# Patient Record
Sex: FEMALE | Race: WHITE | NOT HISPANIC OR LATINO | URBAN - METROPOLITAN AREA
[De-identification: names, ages, dates, MRNs, and addresses within clinical notes are randomized per-mention and may not be internally consistent; named-entity substitution may affect disease eponyms.]

---

## 2017-10-05 ENCOUNTER — INPATIENT (INPATIENT)
Facility: HOSPITAL | Age: 32
LOS: 1 days | Discharge: HOME | End: 2017-10-07
Attending: OBSTETRICS & GYNECOLOGY | Admitting: OBSTETRICS & GYNECOLOGY

## 2017-10-05 VITALS
RESPIRATION RATE: 18 BRPM | SYSTOLIC BLOOD PRESSURE: 121 MMHG | DIASTOLIC BLOOD PRESSURE: 69 MMHG | HEART RATE: 117 BPM | TEMPERATURE: 36 F

## 2017-10-05 LAB
ANISOCYTOSIS BLD QL: SLIGHT — SIGNIFICANT CHANGE UP
ANISOCYTOSIS BLD QL: SLIGHT — SIGNIFICANT CHANGE UP
BASO STIPL BLD QL SMEAR: SLIGHT — SIGNIFICANT CHANGE UP
BASOPHILS # BLD AUTO: 0.1 K/UL — SIGNIFICANT CHANGE UP (ref 0–0.2)
BASOPHILS # BLD AUTO: 0.1 K/UL — SIGNIFICANT CHANGE UP (ref 0–0.2)
BASOPHILS NFR BLD AUTO: 0.6 % — SIGNIFICANT CHANGE UP (ref 0–2)
BASOPHILS NFR BLD AUTO: 0.6 % — SIGNIFICANT CHANGE UP (ref 0–2)
BLD GP AB SCN SERPL QL: SIGNIFICANT CHANGE UP
DACRYOCYTES BLD QL SMEAR: SLIGHT — SIGNIFICANT CHANGE UP
DACRYOCYTES BLD QL SMEAR: SLIGHT — SIGNIFICANT CHANGE UP
ELLIPTOCYTES BLD QL SMEAR: SLIGHT — SIGNIFICANT CHANGE UP
EOSINOPHIL # BLD AUTO: 0.1 K/UL — SIGNIFICANT CHANGE UP (ref 0–0.5)
EOSINOPHIL # BLD AUTO: 0.2 K/UL — SIGNIFICANT CHANGE UP (ref 0–0.5)
EOSINOPHIL NFR BLD AUTO: 0.6 % — SIGNIFICANT CHANGE UP (ref 0–6)
EOSINOPHIL NFR BLD AUTO: 1.3 % — SIGNIFICANT CHANGE UP (ref 0–6)
HCT VFR BLD CALC: 30.4 % — LOW (ref 34.5–45)
HCT VFR BLD CALC: 31.5 % — LOW (ref 34.5–45)
HGB BLD-MCNC: 10 G/DL — LOW (ref 11.5–15.5)
HGB BLD-MCNC: 10.5 G/DL — LOW (ref 11.5–15.5)
HYPOCHROMIA BLD QL: SLIGHT — SIGNIFICANT CHANGE UP
LYMPHOCYTES # BLD AUTO: 1.9 K/UL — SIGNIFICANT CHANGE UP (ref 1–3.3)
LYMPHOCYTES # BLD AUTO: 17.9 % — SIGNIFICANT CHANGE UP (ref 13–44)
LYMPHOCYTES # BLD AUTO: 3.4 K/UL — HIGH (ref 1–3.3)
LYMPHOCYTES # BLD AUTO: 9.9 % — LOW (ref 13–44)
MANUAL DIF COMMENT BLD-IMP: SIGNIFICANT CHANGE UP
MANUAL DIF COMMENT BLD-IMP: SIGNIFICANT CHANGE UP
MCHC RBC-ENTMCNC: 25.8 PG — LOW (ref 27–34)
MCHC RBC-ENTMCNC: 25.8 PG — LOW (ref 27–34)
MCHC RBC-ENTMCNC: 33 GM/DL — SIGNIFICANT CHANGE UP (ref 32–36)
MCHC RBC-ENTMCNC: 33.2 GM/DL — SIGNIFICANT CHANGE UP (ref 32–36)
MCV RBC AUTO: 77.8 FL — LOW (ref 80–100)
MCV RBC AUTO: 78.3 FL — LOW (ref 80–100)
MICROCYTES BLD QL: SLIGHT — SIGNIFICANT CHANGE UP
MICROCYTES BLD QL: SLIGHT — SIGNIFICANT CHANGE UP
MONOCYTES # BLD AUTO: 1 K/UL — HIGH (ref 0–0.9)
MONOCYTES # BLD AUTO: 1.5 K/UL — HIGH (ref 0–0.9)
MONOCYTES NFR BLD AUTO: 5.3 % — SIGNIFICANT CHANGE UP (ref 2–14)
MONOCYTES NFR BLD AUTO: 7.9 % — SIGNIFICANT CHANGE UP (ref 2–14)
NEUTROPHILS # BLD AUTO: 13.9 K/UL — HIGH (ref 1.8–7.4)
NEUTROPHILS # BLD AUTO: 16.2 K/UL — HIGH (ref 1.8–7.4)
NEUTROPHILS NFR BLD AUTO: 72.3 % — SIGNIFICANT CHANGE UP (ref 43–77)
NEUTROPHILS NFR BLD AUTO: 83.5 % — HIGH (ref 43–77)
OVALOCYTES BLD QL SMEAR: SLIGHT — SIGNIFICANT CHANGE UP
PLAT MORPH BLD: NORMAL — SIGNIFICANT CHANGE UP
PLAT MORPH BLD: NORMAL — SIGNIFICANT CHANGE UP
PLATELET # BLD AUTO: 267 K/UL — SIGNIFICANT CHANGE UP (ref 150–400)
PLATELET # BLD AUTO: 274 K/UL — SIGNIFICANT CHANGE UP (ref 150–400)
POIKILOCYTOSIS BLD QL AUTO: SLIGHT — SIGNIFICANT CHANGE UP
POIKILOCYTOSIS BLD QL AUTO: SLIGHT — SIGNIFICANT CHANGE UP
POLYCHROMASIA BLD QL SMEAR: SLIGHT — SIGNIFICANT CHANGE UP
POLYCHROMASIA BLD QL SMEAR: SLIGHT — SIGNIFICANT CHANGE UP
RBC # BLD: 3.88 M/UL — SIGNIFICANT CHANGE UP (ref 3.8–5.2)
RBC # BLD: 4.05 M/UL — SIGNIFICANT CHANGE UP (ref 3.8–5.2)
RBC # FLD: 14.2 % — SIGNIFICANT CHANGE UP (ref 10.3–14.5)
RBC # FLD: 14.7 % — HIGH (ref 10.3–14.5)
RBC BLD AUTO: (no result)
RBC BLD AUTO: (no result)
T PALLIDUM AB TITR SER: NEGATIVE — SIGNIFICANT CHANGE UP
TYPE + AB SCN PNL BLD: SIGNIFICANT CHANGE UP
WBC # BLD: 19.2 K/UL — HIGH (ref 3.8–10.5)
WBC # BLD: 19.4 K/UL — HIGH (ref 3.8–10.5)
WBC # FLD AUTO: 19.2 K/UL — HIGH (ref 3.8–10.5)
WBC # FLD AUTO: 19.4 K/UL — HIGH (ref 3.8–10.5)

## 2017-10-05 RX ORDER — OXYTOCIN 10 UNIT/ML
41.67 VIAL (ML) INJECTION
Qty: 20 | Refills: 0 | Status: DISCONTINUED | OUTPATIENT
Start: 2017-10-05 | End: 2017-10-07

## 2017-10-05 RX ORDER — CITRIC ACID/SODIUM CITRATE 300-500 MG
15 SOLUTION, ORAL ORAL EVERY 4 HOURS
Qty: 0 | Refills: 0 | Status: DISCONTINUED | OUTPATIENT
Start: 2017-10-05 | End: 2017-10-05

## 2017-10-05 RX ORDER — TETANUS TOXOID, REDUCED DIPHTHERIA TOXOID AND ACELLULAR PERTUSSIS VACCINE, ADSORBED 5; 2.5; 8; 8; 2.5 [IU]/.5ML; [IU]/.5ML; UG/.5ML; UG/.5ML; UG/.5ML
0.5 SUSPENSION INTRAMUSCULAR ONCE
Qty: 0 | Refills: 0 | Status: DISCONTINUED | OUTPATIENT
Start: 2017-10-05 | End: 2017-10-07

## 2017-10-05 RX ORDER — SODIUM CHLORIDE 9 MG/ML
3 INJECTION INTRAMUSCULAR; INTRAVENOUS; SUBCUTANEOUS EVERY 8 HOURS
Qty: 0 | Refills: 0 | Status: DISCONTINUED | OUTPATIENT
Start: 2017-10-05 | End: 2017-10-05

## 2017-10-05 RX ORDER — IBUPROFEN 200 MG
600 TABLET ORAL EVERY 6 HOURS
Qty: 0 | Refills: 0 | Status: DISCONTINUED | OUTPATIENT
Start: 2017-10-05 | End: 2017-10-07

## 2017-10-05 RX ORDER — OXYTOCIN 10 UNIT/ML
333.33 VIAL (ML) INJECTION
Qty: 20 | Refills: 0 | Status: DISCONTINUED | OUTPATIENT
Start: 2017-10-05 | End: 2017-10-05

## 2017-10-05 RX ORDER — MAGNESIUM HYDROXIDE 400 MG/1
30 TABLET, CHEWABLE ORAL
Qty: 0 | Refills: 0 | Status: DISCONTINUED | OUTPATIENT
Start: 2017-10-05 | End: 2017-10-07

## 2017-10-05 RX ORDER — HYDROCORTISONE 1 %
1 OINTMENT (GRAM) TOPICAL EVERY 4 HOURS
Qty: 0 | Refills: 0 | Status: DISCONTINUED | OUTPATIENT
Start: 2017-10-05 | End: 2017-10-07

## 2017-10-05 RX ORDER — DIBUCAINE 1 %
1 OINTMENT (GRAM) RECTAL EVERY 4 HOURS
Qty: 0 | Refills: 0 | Status: DISCONTINUED | OUTPATIENT
Start: 2017-10-05 | End: 2017-10-07

## 2017-10-05 RX ORDER — OXYTOCIN 10 UNIT/ML
10 VIAL (ML) INJECTION ONCE
Qty: 0 | Refills: 0 | Status: DISCONTINUED | OUTPATIENT
Start: 2017-10-05 | End: 2017-10-07

## 2017-10-05 RX ORDER — LANOLIN
1 OINTMENT (GRAM) TOPICAL EVERY 6 HOURS
Qty: 0 | Refills: 0 | Status: DISCONTINUED | OUTPATIENT
Start: 2017-10-05 | End: 2017-10-07

## 2017-10-05 RX ORDER — ACETAMINOPHEN 500 MG
650 TABLET ORAL EVERY 6 HOURS
Qty: 0 | Refills: 0 | Status: DISCONTINUED | OUTPATIENT
Start: 2017-10-05 | End: 2017-10-07

## 2017-10-05 RX ORDER — AER TRAVELER 0.5 G/1
1 SOLUTION RECTAL; TOPICAL EVERY 4 HOURS
Qty: 0 | Refills: 0 | Status: DISCONTINUED | OUTPATIENT
Start: 2017-10-05 | End: 2017-10-07

## 2017-10-05 RX ORDER — GLYCERIN ADULT
1 SUPPOSITORY, RECTAL RECTAL AT BEDTIME
Qty: 0 | Refills: 0 | Status: DISCONTINUED | OUTPATIENT
Start: 2017-10-05 | End: 2017-10-07

## 2017-10-05 RX ORDER — BUTORPHANOL TARTRATE 2 MG/ML
2 INJECTION, SOLUTION INTRAMUSCULAR; INTRAVENOUS ONCE
Qty: 0 | Refills: 0 | Status: DISCONTINUED | OUTPATIENT
Start: 2017-10-05 | End: 2017-10-05

## 2017-10-05 RX ORDER — PRAMOXINE HYDROCHLORIDE 150 MG/15G
1 AEROSOL, FOAM RECTAL EVERY 4 HOURS
Qty: 0 | Refills: 0 | Status: DISCONTINUED | OUTPATIENT
Start: 2017-10-05 | End: 2017-10-07

## 2017-10-05 RX ORDER — DOCUSATE SODIUM 100 MG
100 CAPSULE ORAL
Qty: 0 | Refills: 0 | Status: DISCONTINUED | OUTPATIENT
Start: 2017-10-05 | End: 2017-10-07

## 2017-10-05 RX ORDER — SIMETHICONE 80 MG/1
80 TABLET, CHEWABLE ORAL EVERY 6 HOURS
Qty: 0 | Refills: 0 | Status: DISCONTINUED | OUTPATIENT
Start: 2017-10-05 | End: 2017-10-07

## 2017-10-05 RX ORDER — DIPHENHYDRAMINE HCL 50 MG
25 CAPSULE ORAL EVERY 6 HOURS
Qty: 0 | Refills: 0 | Status: DISCONTINUED | OUTPATIENT
Start: 2017-10-05 | End: 2017-10-07

## 2017-10-05 RX ADMIN — BUTORPHANOL TARTRATE 2 MILLIGRAM(S): 2 INJECTION, SOLUTION INTRAMUSCULAR; INTRAVENOUS at 08:51

## 2017-10-05 RX ADMIN — Medication 600 MILLIGRAM(S): at 14:03

## 2017-10-05 RX ADMIN — Medication 100 MILLIGRAM(S): at 17:02

## 2017-10-06 RX ADMIN — Medication 1 TABLET(S): at 13:04

## 2017-10-06 RX ADMIN — Medication 600 MILLIGRAM(S): at 19:30

## 2017-10-06 RX ADMIN — Medication 600 MILLIGRAM(S): at 09:50

## 2017-10-06 RX ADMIN — Medication 600 MILLIGRAM(S): at 01:26

## 2017-10-06 RX ADMIN — Medication 100 MILLIGRAM(S): at 19:05

## 2017-10-06 RX ADMIN — Medication 600 MILLIGRAM(S): at 08:56

## 2017-10-06 RX ADMIN — Medication 600 MILLIGRAM(S): at 19:04

## 2017-10-06 RX ADMIN — Medication 100 MILLIGRAM(S): at 08:56

## 2017-10-06 RX ADMIN — Medication 600 MILLIGRAM(S): at 02:30

## 2017-10-06 NOTE — PROGRESS NOTE ADULT - SUBJECTIVE AND OBJECTIVE BOX
S: PPD# 1  Patient doing well. Minimal lochia. No complaints.     O: Vital Signs Last 24 Hrs  T(C): 37.1 (05 Oct 2017 20:07), Max: 37.1 (05 Oct 2017 20:07)  T(F): 98.7 (05 Oct 2017 20:07), Max: 98.7 (05 Oct 2017 20:07)  HR: 69 (05 Oct 2017 20:07) (61 - 95)  BP: 112/69 (05 Oct 2017 20:07) (97/76 - 129/74)  BP(mean): --  RR: 16 (05 Oct 2017 20:07) (15 - 18)  SpO2: 100% (05 Oct 2017 20:07) (100% - 100%)    Gen: NAD  Abd: soft, NT, ND, fundus firm below umbilicus  Ext: no tenderness  Perineum: intact  Labs:                        10.0   19.2  )-----------( 267      ( 05 Oct 2017 18:47 )             30.4     Antibody Screen: NEG (10-05 @ 10:23)     Rubella status:  Rh status:   A: 32y PPD# 2 s/p      Doing well    Plan:  Analgesia as needed  Routine post partum care

## 2017-10-07 ENCOUNTER — TRANSCRIPTION ENCOUNTER (OUTPATIENT)
Age: 32
End: 2017-10-07

## 2017-10-07 VITALS
OXYGEN SATURATION: 98 % | DIASTOLIC BLOOD PRESSURE: 67 MMHG | HEART RATE: 79 BPM | TEMPERATURE: 98 F | SYSTOLIC BLOOD PRESSURE: 105 MMHG | RESPIRATION RATE: 16 BRPM

## 2017-10-07 RX ORDER — DIBUCAINE 1 %
1 OINTMENT (GRAM) RECTAL
Qty: 0 | Refills: 0 | COMMUNITY
Start: 2017-10-07

## 2017-10-07 RX ORDER — IBUPROFEN 200 MG
1 TABLET ORAL
Qty: 0 | Refills: 0 | COMMUNITY
Start: 2017-10-07

## 2017-10-07 RX ADMIN — Medication 1 TABLET(S): at 12:03

## 2017-10-07 RX ADMIN — Medication 600 MILLIGRAM(S): at 08:32

## 2017-10-07 RX ADMIN — Medication 600 MILLIGRAM(S): at 09:15

## 2017-10-07 NOTE — PROGRESS NOTE ADULT - ASSESSMENT
PPD#2 s/p   -Discharge home   -Follow up 6 weeks with Dr. Hernandez  -Postpartum discharge instructions given

## 2017-10-07 NOTE — DISCHARGE NOTE OB - MEDICATION SUMMARY - MEDICATIONS TO STOP TAKING
I will STOP taking the medications listed below when I get home from the hospital:    Prenatal 1 oral capsule  -- 1 cap(s) by mouth once a day

## 2017-10-07 NOTE — PROGRESS NOTE ADULT - SUBJECTIVE AND OBJECTIVE BOX
32y  Female PPD#2  .   S: Patient is doing well and has no acute complaints. Pain is well controlled with medications. Patient is tolerating regular diet. She is OOB and urinating w/o any difficulties.  Denies bowel movement at this time. Patient is breast and bottle feeding.     O: Vital Signs Last 24 Hrs  T(C): 36.6 (07 Oct 2017 07:00), Max: 36.6 (06 Oct 2017 08:00)  T(F): 97.9 (07 Oct 2017 07:00), Max: 97.9 (07 Oct 2017 07:00)  HR: 79 (07 Oct 2017 07:00) (72 - 79)  BP: 105/67 (07 Oct 2017 07:00) (105/67 - 114/57)  RR: 16 (07 Oct 2017 07:00) (16 - 16)  SpO2: 98% (07 Oct 2017 07:00) (98% - 98%)  Appears well, resting in bed  Abdo: Fundus firm w/o tenderness   PV: No calf tenderness, edema.     Labs:                         10.0   19.2  )-----------( 267      ( 05 Oct 2017 18:47 )             30.4

## 2017-10-07 NOTE — DISCHARGE NOTE OB - ADMISSION DATE +STARTOFVISITDATE
Statement Selected Scheduled for bardport insertion on 3/3/2017. Preoperative instructions discussed and given to patient. Verbalized understanding

## 2017-10-07 NOTE — DISCHARGE NOTE OB - CARE PLAN
Principal Discharge DX:	 (normal spontaneous vaginal delivery)  Goal:	Discharge home  Instructions for follow-up, activity and diet:	Routine postpartum discharge instructions

## 2017-10-07 NOTE — DISCHARGE NOTE OB - MEDICATION SUMMARY - MEDICATIONS TO TAKE
I will START or STAY ON the medications listed below when I get home from the hospital:    ibuprofen 600 mg oral tablet  -- 1 tab(s) by mouth every 6 hours, As needed, Moderate Pain  -- Indication: For postpartum care    dibucaine 1% topical ointment  -- 1 application on skin every 4 hours, As needed, Perineal Discomfort  -- Indication: For postpartum care

## 2017-10-07 NOTE — DISCHARGE NOTE OB - CARE PROVIDER_API CALL
Butch Hernandez), Obstetrics and Gynecology  [address] [place] [address]  Phone: [phone]  Fax: [phone]

## 2017-10-07 NOTE — DISCHARGE NOTE OB - PATIENT PORTAL LINK FT
“You can access the Follow[facility] Patient Portal, offered by [facility], by registering with the following website: [email or web address]”

## 2017-10-07 NOTE — DISCHARGE NOTE OB - IF YOU ARE A SMOKER, IT IS IMPORTANT FOR YOUR HEALTH TO STOP SMOKING. PLEASE BE AWARE THAT SECOND HAND SMOKE IS ALSO HARMFUL.
Patient presents for an OB visit for RUQ pain.    Patient would like communication of their results via:        Cell Phone:   Telephone Information:   Mobile [phone]     Okay to leave a message containing results? Yes    Patient's current [facility] status: Active.    Statement Selected

## 2017-10-11 DIAGNOSIS — Z3A.38 38 WEEKS GESTATION OF PREGNANCY: ICD-10-CM

## 2018-12-07 ENCOUNTER — EMERGENCY (EMERGENCY)
Facility: HOSPITAL | Age: 33
LOS: 0 days | Discharge: HOME | End: 2018-12-07
Attending: EMERGENCY MEDICINE | Admitting: EMERGENCY MEDICINE
Payer: MEDICAID

## 2018-12-07 VITALS
RESPIRATION RATE: 14 BRPM | HEART RATE: 68 BPM | DIASTOLIC BLOOD PRESSURE: 64 MMHG | OXYGEN SATURATION: 100 % | SYSTOLIC BLOOD PRESSURE: 127 MMHG

## 2018-12-07 VITALS — HEIGHT: 65 IN | WEIGHT: 139.99 LBS

## 2018-12-07 DIAGNOSIS — Z90.89 ACQUIRED ABSENCE OF OTHER ORGANS: ICD-10-CM

## 2018-12-07 DIAGNOSIS — R07.9 CHEST PAIN, UNSPECIFIED: ICD-10-CM

## 2018-12-07 DIAGNOSIS — K80.50 CALCULUS OF BILE DUCT WITHOUT CHOLANGITIS OR CHOLECYSTITIS WITHOUT OBSTRUCTION: ICD-10-CM

## 2018-12-07 DIAGNOSIS — Z90.49 ACQUIRED ABSENCE OF OTHER SPECIFIED PARTS OF DIGESTIVE TRACT: Chronic | ICD-10-CM

## 2018-12-07 LAB
ALBUMIN SERPL ELPH-MCNC: 3.5 G/DL — SIGNIFICANT CHANGE UP (ref 3.3–5)
ALP SERPL-CCNC: 74 U/L — SIGNIFICANT CHANGE UP (ref 40–120)
ALT FLD-CCNC: 105 U/L — HIGH (ref 12–78)
ANION GAP SERPL CALC-SCNC: 7 MMOL/L — SIGNIFICANT CHANGE UP (ref 5–17)
AST SERPL-CCNC: 57 U/L — HIGH (ref 15–37)
BASOPHILS # BLD AUTO: 0.05 K/UL — SIGNIFICANT CHANGE UP (ref 0–0.2)
BASOPHILS NFR BLD AUTO: 0.5 % — SIGNIFICANT CHANGE UP (ref 0–2)
BILIRUB SERPL-MCNC: 0.3 MG/DL — SIGNIFICANT CHANGE UP (ref 0.2–1.2)
BUN SERPL-MCNC: 15 MG/DL — SIGNIFICANT CHANGE UP (ref 7–23)
CALCIUM SERPL-MCNC: 8.4 MG/DL — LOW (ref 8.5–10.1)
CHLORIDE SERPL-SCNC: 108 MMOL/L — SIGNIFICANT CHANGE UP (ref 96–108)
CO2 SERPL-SCNC: 26 MMOL/L — SIGNIFICANT CHANGE UP (ref 22–31)
CREAT SERPL-MCNC: 0.96 MG/DL — SIGNIFICANT CHANGE UP (ref 0.5–1.3)
EOSINOPHIL # BLD AUTO: 0.34 K/UL — SIGNIFICANT CHANGE UP (ref 0–0.5)
EOSINOPHIL NFR BLD AUTO: 3.5 % — SIGNIFICANT CHANGE UP (ref 0–6)
GLUCOSE SERPL-MCNC: 81 MG/DL — SIGNIFICANT CHANGE UP (ref 70–99)
HCT VFR BLD CALC: 37.3 % — SIGNIFICANT CHANGE UP (ref 34.5–45)
HGB BLD-MCNC: 12.8 G/DL — SIGNIFICANT CHANGE UP (ref 11.5–15.5)
IMM GRANULOCYTES NFR BLD AUTO: 0.3 % — SIGNIFICANT CHANGE UP (ref 0–1.5)
LIDOCAIN IGE QN: 212 U/L — SIGNIFICANT CHANGE UP (ref 73–393)
LYMPHOCYTES # BLD AUTO: 2.61 K/UL — SIGNIFICANT CHANGE UP (ref 1–3.3)
LYMPHOCYTES # BLD AUTO: 26.9 % — SIGNIFICANT CHANGE UP (ref 13–44)
MCHC RBC-ENTMCNC: 29.4 PG — SIGNIFICANT CHANGE UP (ref 27–34)
MCHC RBC-ENTMCNC: 34.3 GM/DL — SIGNIFICANT CHANGE UP (ref 32–36)
MCV RBC AUTO: 85.7 FL — SIGNIFICANT CHANGE UP (ref 80–100)
MONOCYTES # BLD AUTO: 0.71 K/UL — SIGNIFICANT CHANGE UP (ref 0–0.9)
MONOCYTES NFR BLD AUTO: 7.3 % — SIGNIFICANT CHANGE UP (ref 2–14)
NEUTROPHILS # BLD AUTO: 5.98 K/UL — SIGNIFICANT CHANGE UP (ref 1.8–7.4)
NEUTROPHILS NFR BLD AUTO: 61.5 % — SIGNIFICANT CHANGE UP (ref 43–77)
NRBC # BLD: 0 /100 WBCS — SIGNIFICANT CHANGE UP (ref 0–0)
PLATELET # BLD AUTO: 306 K/UL — SIGNIFICANT CHANGE UP (ref 150–400)
POTASSIUM SERPL-MCNC: 3.7 MMOL/L — SIGNIFICANT CHANGE UP (ref 3.5–5.3)
POTASSIUM SERPL-SCNC: 3.7 MMOL/L — SIGNIFICANT CHANGE UP (ref 3.5–5.3)
PROT SERPL-MCNC: 7.4 GM/DL — SIGNIFICANT CHANGE UP (ref 6–8.3)
RBC # BLD: 4.35 M/UL — SIGNIFICANT CHANGE UP (ref 3.8–5.2)
RBC # FLD: 13.5 % — SIGNIFICANT CHANGE UP (ref 10.3–14.5)
SODIUM SERPL-SCNC: 141 MMOL/L — SIGNIFICANT CHANGE UP (ref 135–145)
TROPONIN I SERPL-MCNC: <0.015 NG/ML — SIGNIFICANT CHANGE UP (ref 0.01–0.04)
WBC # BLD: 9.72 K/UL — SIGNIFICANT CHANGE UP (ref 3.8–10.5)
WBC # FLD AUTO: 9.72 K/UL — SIGNIFICANT CHANGE UP (ref 3.8–10.5)

## 2018-12-07 PROCEDURE — 71046 X-RAY EXAM CHEST 2 VIEWS: CPT | Mod: 26

## 2018-12-07 PROCEDURE — 93010 ELECTROCARDIOGRAM REPORT: CPT

## 2018-12-07 PROCEDURE — 99285 EMERGENCY DEPT VISIT HI MDM: CPT

## 2018-12-07 PROCEDURE — 76705 ECHO EXAM OF ABDOMEN: CPT | Mod: 26

## 2018-12-07 NOTE — ED STATDOCS - OBJECTIVE STATEMENT
34 y/o female with a PMHx of appendectomy presents to the ED c/o intermittent chest pain. Pt states she had an episode of the pain on 11/30/18 and again last night. Pain described as starting as a dull pain that progresses to a severe crushing pain, pt describes it as "an elephant stepping on my chest." On 11/30/18, pt reports vomiting. Last night, pt reports nausea, indigestion, belching, abd discomfort. Denies vomiting last night, recent travel, peripheral edema. Pain has resolved and pt has no symptoms at time of eval.

## 2018-12-07 NOTE — ED ADULT NURSE NOTE - OBJECTIVE STATEMENT
Patient c/o substernal, crushing chest pain that intermittently radiates to back x 2 days. Patient went to  yesterday, she was given 4 baby aspirins and was told to come to ED last night, but came today. Patient presents today stating the pain is worse. Patient states she recently had URI, cough. Denies fever. Afebrile in ED. Patient denies dizziness, dyspnea, shortness of breath, palpitations. Denies any personal or family cardiac hx. Patient offers no other complaints at this time. Cardiac monitoring in progress.

## 2018-12-07 NOTE — ED ADULT TRIAGE NOTE - CHIEF COMPLAINT QUOTE
c/o sharp pain to chest lasting 20 minutes with N/V and abdominal discomfort , belching , took 4 baby Aspirin

## 2018-12-07 NOTE — ED ADULT NURSE NOTE - NSIMPLEMENTINTERV_GEN_ALL_ED
Implemented All Universal Safety Interventions:  [place] to call system. Call bell, personal items and telephone within reach. Instruct patient to call for assistance. Room bathroom lighting operational. Non-slip footwear when patient is off stretcher. Physically safe environment: no spills, clutter or unnecessary equipment. Stretcher in lowest position, wheels locked, appropriate side rails in place.

## 2018-12-07 NOTE — ED STATDOCS - MEDICAL DECISION MAKING DETAILS
Pt presenting with intermittent CP. Normal exam, asymptomatic at this time. Will obtain labs, EKG, CXR.

## 2018-12-07 NOTE — ED STATDOCS - PROGRESS NOTE DETAILS
Patient seen and evaluatd with ED attending at intake.  Asymptomatic at this time.  1mm of ST elevation in leads 2,3 and AVF.  Call placed to Dr. Llanes to have him reviewed the EKG.  Low suspicion for cardiac cause of her symptoms last night.  labs pending -Justin Foster PA-C AJM; ecg showed to dr gayle who ntoes LISSETTE. stable for dc home with outpatient follow up. no signs of cardiac ischemia Workup unremarkable.  Reviewed results with patient as well as return precautions and GI follow up as she vomits after the episodes.  Patient concerned her symptoms are related to her gallbladder because her friend had similar symptoms.  Reviewed concerns with ED attending, Dr. Garduno.  She is currently asymptomatic and nontender in RUQ, but will order sono to address her concerns. -Justin Foster PA-C +gallstones with some wall thickening, no surrounding fluid.  She has eaten here in the ED without pain, continues to had a soft, NT, ND abdomen and is afebrile.  Reviewed findings of gallstones and her pain likely being biliary colic.   Case d/w ED attending Dr. Garduno who agrees patient is safe for d/c at this time. REviewed GI follow up, surgical follow up and strict return precautions for new or worsening symptoms -Justin Foster PA-C

## 2018-12-30 ENCOUNTER — INPATIENT (INPATIENT)
Facility: HOSPITAL | Age: 33
LOS: 1 days | Discharge: HOME | End: 2019-01-01
Attending: SURGERY | Admitting: SURGERY
Payer: COMMERCIAL

## 2018-12-30 VITALS — HEIGHT: 65 IN | WEIGHT: 139.99 LBS

## 2018-12-30 DIAGNOSIS — Z90.49 ACQUIRED ABSENCE OF OTHER SPECIFIED PARTS OF DIGESTIVE TRACT: Chronic | ICD-10-CM

## 2018-12-30 LAB
ALBUMIN SERPL ELPH-MCNC: 3.5 G/DL — SIGNIFICANT CHANGE UP (ref 3.3–5)
ALP SERPL-CCNC: 197 U/L — HIGH (ref 40–120)
ALT FLD-CCNC: 1223 U/L — HIGH (ref 12–78)
ANION GAP SERPL CALC-SCNC: 5 MMOL/L — SIGNIFICANT CHANGE UP (ref 5–17)
APPEARANCE UR: CLEAR — SIGNIFICANT CHANGE UP
APTT BLD: 33.5 SEC — SIGNIFICANT CHANGE UP (ref 27.5–36.3)
AST SERPL-CCNC: 613 U/L — HIGH (ref 15–37)
BACTERIA # UR AUTO: ABNORMAL
BILIRUB SERPL-MCNC: 2.7 MG/DL — HIGH (ref 0.2–1.2)
BILIRUB UR-MCNC: ABNORMAL
BUN SERPL-MCNC: 9 MG/DL — SIGNIFICANT CHANGE UP (ref 7–23)
CALCIUM SERPL-MCNC: 9 MG/DL — SIGNIFICANT CHANGE UP (ref 8.5–10.1)
CHLORIDE SERPL-SCNC: 108 MMOL/L — SIGNIFICANT CHANGE UP (ref 96–108)
CO2 SERPL-SCNC: 27 MMOL/L — SIGNIFICANT CHANGE UP (ref 22–31)
COLOR SPEC: YELLOW — SIGNIFICANT CHANGE UP
CREAT SERPL-MCNC: 0.95 MG/DL — SIGNIFICANT CHANGE UP (ref 0.5–1.3)
DIFF PNL FLD: NEGATIVE — SIGNIFICANT CHANGE UP
GLUCOSE SERPL-MCNC: 77 MG/DL — SIGNIFICANT CHANGE UP (ref 70–99)
GLUCOSE UR QL: NEGATIVE MG/DL — SIGNIFICANT CHANGE UP
HCG SERPL-ACNC: <1 MIU/ML — SIGNIFICANT CHANGE UP
HCT VFR BLD CALC: 37.2 % — SIGNIFICANT CHANGE UP (ref 34.5–45)
HGB BLD-MCNC: 12.6 G/DL — SIGNIFICANT CHANGE UP (ref 11.5–15.5)
INR BLD: 1.17 RATIO — HIGH (ref 0.88–1.16)
KETONES UR-MCNC: ABNORMAL
LEUKOCYTE ESTERASE UR-ACNC: ABNORMAL
LIDOCAIN IGE QN: 122 U/L — SIGNIFICANT CHANGE UP (ref 73–393)
MCHC RBC-ENTMCNC: 29 PG — SIGNIFICANT CHANGE UP (ref 27–34)
MCHC RBC-ENTMCNC: 33.9 GM/DL — SIGNIFICANT CHANGE UP (ref 32–36)
MCV RBC AUTO: 85.7 FL — SIGNIFICANT CHANGE UP (ref 80–100)
NITRITE UR-MCNC: NEGATIVE — SIGNIFICANT CHANGE UP
NRBC # BLD: 0 /100 WBCS — SIGNIFICANT CHANGE UP (ref 0–0)
PH UR: 8 — SIGNIFICANT CHANGE UP (ref 5–8)
PLATELET # BLD AUTO: 246 K/UL — SIGNIFICANT CHANGE UP (ref 150–400)
POTASSIUM SERPL-MCNC: 4.1 MMOL/L — SIGNIFICANT CHANGE UP (ref 3.5–5.3)
POTASSIUM SERPL-SCNC: 4.1 MMOL/L — SIGNIFICANT CHANGE UP (ref 3.5–5.3)
PROT SERPL-MCNC: 6.8 GM/DL — SIGNIFICANT CHANGE UP (ref 6–8.3)
PROT UR-MCNC: NEGATIVE MG/DL — SIGNIFICANT CHANGE UP
PROTHROM AB SERPL-ACNC: 13.1 SEC — HIGH (ref 10–12.9)
RBC # BLD: 4.34 M/UL — SIGNIFICANT CHANGE UP (ref 3.8–5.2)
RBC # FLD: 13.5 % — SIGNIFICANT CHANGE UP (ref 10.3–14.5)
RBC CASTS # UR COMP ASSIST: SIGNIFICANT CHANGE UP /HPF (ref 0–4)
SODIUM SERPL-SCNC: 140 MMOL/L — SIGNIFICANT CHANGE UP (ref 135–145)
SP GR SPEC: 1.01 — SIGNIFICANT CHANGE UP (ref 1.01–1.02)
UROBILINOGEN FLD QL: 4 MG/DL
WBC # BLD: 7.06 K/UL — SIGNIFICANT CHANGE UP (ref 3.8–10.5)
WBC # FLD AUTO: 7.06 K/UL — SIGNIFICANT CHANGE UP (ref 3.8–10.5)
WBC UR QL: SIGNIFICANT CHANGE UP

## 2018-12-30 PROCEDURE — 99285 EMERGENCY DEPT VISIT HI MDM: CPT

## 2018-12-30 PROCEDURE — 74183 MRI ABD W/O CNTR FLWD CNTR: CPT | Mod: 26

## 2018-12-30 PROCEDURE — 76705 ECHO EXAM OF ABDOMEN: CPT | Mod: 26

## 2018-12-30 RX ORDER — CIPROFLOXACIN LACTATE 400MG/40ML
400 VIAL (ML) INTRAVENOUS ONCE
Qty: 0 | Refills: 0 | Status: COMPLETED | OUTPATIENT
Start: 2018-12-30 | End: 2018-12-30

## 2018-12-30 RX ORDER — CIPROFLOXACIN LACTATE 400MG/40ML
VIAL (ML) INTRAVENOUS
Qty: 0 | Refills: 0 | Status: DISCONTINUED | OUTPATIENT
Start: 2018-12-30 | End: 2018-12-31

## 2018-12-30 RX ORDER — METRONIDAZOLE 500 MG
500 TABLET ORAL EVERY 8 HOURS
Qty: 0 | Refills: 0 | Status: DISCONTINUED | OUTPATIENT
Start: 2018-12-30 | End: 2018-12-31

## 2018-12-30 RX ORDER — ACETAMINOPHEN 500 MG
650 TABLET ORAL EVERY 6 HOURS
Qty: 0 | Refills: 0 | Status: DISCONTINUED | OUTPATIENT
Start: 2018-12-30 | End: 2018-12-31

## 2018-12-30 RX ORDER — IBUPROFEN 200 MG
600 TABLET ORAL EVERY 6 HOURS
Qty: 0 | Refills: 0 | Status: DISCONTINUED | OUTPATIENT
Start: 2018-12-30 | End: 2018-12-31

## 2018-12-30 RX ORDER — FAMOTIDINE 10 MG/ML
20 INJECTION INTRAVENOUS EVERY 12 HOURS
Qty: 0 | Refills: 0 | Status: DISCONTINUED | OUTPATIENT
Start: 2018-12-30 | End: 2018-12-31

## 2018-12-30 RX ORDER — INFLUENZA VIRUS VACCINE 15; 15; 15; 15 UG/.5ML; UG/.5ML; UG/.5ML; UG/.5ML
0.5 SUSPENSION INTRAMUSCULAR ONCE
Qty: 0 | Refills: 0 | Status: COMPLETED | OUTPATIENT
Start: 2018-12-30 | End: 2018-12-30

## 2018-12-30 RX ORDER — METRONIDAZOLE 500 MG
500 TABLET ORAL ONCE
Qty: 0 | Refills: 0 | Status: COMPLETED | OUTPATIENT
Start: 2018-12-30 | End: 2018-12-30

## 2018-12-30 RX ORDER — METRONIDAZOLE 500 MG
TABLET ORAL
Qty: 0 | Refills: 0 | Status: DISCONTINUED | OUTPATIENT
Start: 2018-12-30 | End: 2018-12-31

## 2018-12-30 RX ORDER — ONDANSETRON 8 MG/1
4 TABLET, FILM COATED ORAL EVERY 6 HOURS
Qty: 0 | Refills: 0 | Status: DISCONTINUED | OUTPATIENT
Start: 2018-12-30 | End: 2018-12-31

## 2018-12-30 RX ORDER — OXYCODONE AND ACETAMINOPHEN 5; 325 MG/1; MG/1
1 TABLET ORAL EVERY 6 HOURS
Qty: 0 | Refills: 0 | Status: DISCONTINUED | OUTPATIENT
Start: 2018-12-30 | End: 2018-12-31

## 2018-12-30 RX ORDER — SODIUM CHLORIDE 9 MG/ML
1000 INJECTION INTRAMUSCULAR; INTRAVENOUS; SUBCUTANEOUS
Qty: 0 | Refills: 0 | Status: DISCONTINUED | OUTPATIENT
Start: 2018-12-30 | End: 2018-12-31

## 2018-12-30 RX ORDER — DOCUSATE SODIUM 100 MG
100 CAPSULE ORAL THREE TIMES A DAY
Qty: 0 | Refills: 0 | Status: DISCONTINUED | OUTPATIENT
Start: 2018-12-30 | End: 2018-12-31

## 2018-12-30 RX ORDER — ENOXAPARIN SODIUM 100 MG/ML
40 INJECTION SUBCUTANEOUS EVERY 24 HOURS
Qty: 0 | Refills: 0 | Status: DISCONTINUED | OUTPATIENT
Start: 2018-12-30 | End: 2018-12-31

## 2018-12-30 RX ORDER — CIPROFLOXACIN LACTATE 400MG/40ML
400 VIAL (ML) INTRAVENOUS EVERY 12 HOURS
Qty: 0 | Refills: 0 | Status: DISCONTINUED | OUTPATIENT
Start: 2018-12-31 | End: 2018-12-31

## 2018-12-30 RX ORDER — ONDANSETRON 8 MG/1
4 TABLET, FILM COATED ORAL ONCE
Qty: 0 | Refills: 0 | Status: COMPLETED | OUTPATIENT
Start: 2018-12-30 | End: 2018-12-30

## 2018-12-30 RX ORDER — METOCLOPRAMIDE HCL 10 MG
10 TABLET ORAL EVERY 6 HOURS
Qty: 0 | Refills: 0 | Status: DISCONTINUED | OUTPATIENT
Start: 2018-12-30 | End: 2018-12-31

## 2018-12-30 RX ORDER — SODIUM CHLORIDE 9 MG/ML
1000 INJECTION INTRAMUSCULAR; INTRAVENOUS; SUBCUTANEOUS ONCE
Qty: 0 | Refills: 0 | Status: COMPLETED | OUTPATIENT
Start: 2018-12-30 | End: 2018-12-30

## 2018-12-30 RX ADMIN — SODIUM CHLORIDE 1000 MILLILITER(S): 9 INJECTION INTRAMUSCULAR; INTRAVENOUS; SUBCUTANEOUS at 09:50

## 2018-12-30 RX ADMIN — ONDANSETRON 4 MILLIGRAM(S): 8 TABLET, FILM COATED ORAL at 09:58

## 2018-12-30 RX ADMIN — SODIUM CHLORIDE 1000 MILLILITER(S): 9 INJECTION INTRAMUSCULAR; INTRAVENOUS; SUBCUTANEOUS at 10:50

## 2018-12-30 RX ADMIN — Medication 100 MILLIGRAM(S): at 20:16

## 2018-12-30 RX ADMIN — ENOXAPARIN SODIUM 40 MILLIGRAM(S): 100 INJECTION SUBCUTANEOUS at 20:15

## 2018-12-30 RX ADMIN — Medication 200 MILLIGRAM(S): at 18:41

## 2018-12-30 NOTE — H&P ADULT - HISTORY OF PRESENT ILLNESS
34 y/o female with a PMHx of Cholelithiasis, s/p appendectomy presents to the ED c/o abd pain x couple of days. +chills, cannot tolerate PO intake. Pt states that she had one episode of emesis last night after eating something. Pt states that her abd pain has worsened over the past few days, states that her pain used to last 20 minutes but yesterday she states the pain lasted the whole day. Denies dysuria, urinary frequency. LNMP: 3 weeks ago. PCP: Dr. Jovel      Vital Signs Last 24 Hrs  T(C): 36.7 (30 Dec 2018 13:55), Max: 36.7 (30 Dec 2018 13:55)  T(F): 98.1 (30 Dec 2018 13:55), Max: 98.1 (30 Dec 2018 13:55)  HR: 77 (30 Dec 2018 13:55) (66 - 77)  BP: 117/69 (30 Dec 2018 13:55) (117/69 - 117/81)  BP(mean): 89 (30 Dec 2018 08:43) (89 - 89)  RR: 16 (30 Dec 2018 13:55) (15 - 16)  SpO2: 99% (30 Dec 2018 13:55) (99% - 99%)                          12.6   7.06  )-----------( 246      ( 30 Dec 2018 09:48 )             37.2       12-30    140  |  108  |  9   ----------------------------<  77  4.1   |  27  |  0.95    Ca    9.0      30 Dec 2018 09:48    TPro  6.8  /  Alb  3.5  /  TBili  2.7<H>  /  DBili  x   /  AST  613<H>  /  ALT  1223<H>  /  AlkPhos  197<H>  12-30

## 2018-12-30 NOTE — H&P ADULT - ASSESSMENT
pt is a 32 yo female presents with abdominal pain, nausea and vomiting, US and MRI showing  cholelithiasis    -Admit to Dr. Sharif service   -pain control PRN   -CLD, NPO PMN   -mefoxin abx   -serial abdominal exams  -GI/DVT ppx   -serial abdominal exams   -tentative laparoscopic cholecystectomy tomorrow     Case discussed with Dr. Sharif

## 2018-12-30 NOTE — ED PROVIDER NOTE - PROGRESS NOTE DETAILS
signed Vania Estrella PA-C Received patient in sign out from Dr Phillip.   Spoke to sx resident Abbie on phone after MRCP result. She requests pt be admitted to Dr Sharif, no ductal stone, plan cholecystectomy tomorrow. Pt agrees with admission and plan of care.

## 2018-12-30 NOTE — H&P ADULT - NSHPROSALLOTHERNEGRD_GEN_ALL_CORE
Problem: Falls - Risk of  Goal: *Absence of Falls  Document Rachael Fall Risk and appropriate interventions in the flowsheet.    Outcome: Progressing Towards Goal  Fall Risk Interventions:  Mobility Interventions: Assess mobility with egress test, OT consult for ADLs, PT Consult for assist device competence, Utilize walker, cane, or other assitive device, Patient to call before getting OOB    Mentation Interventions: Adequate sleep, hydration, pain control    Medication Interventions: Patient to call before getting OOB, Teach patient to arise slowly    Elimination Interventions: Call light in reach, Toileting schedule/hourly rounds, Patient to call for help with toileting needs, Toilet paper/wipes in reach    History of Falls Interventions: Consult care management for discharge planning All other review of systems negative, except as noted in HPI

## 2018-12-30 NOTE — ED ADULT NURSE NOTE - OBJECTIVE STATEMENT
Pt presents to ED c/o RUQ pain starting 2 days ago. Pt reports pain and vomiting after eating. Pt denies diarrhea and fever. Pt reports hx of gallstones.

## 2018-12-30 NOTE — H&P ADULT - NSHPLABSRESULTS_GEN_ALL_CORE
< from: US Abdomen Limited (12.30.18 @ 11:11) >    IMPRESSION:   Numerous calculi within the gallbladder with mild   prominence of the gallbladder wall measuring up to 3.5-4 mm. There is   tenderness in the right upper quadrant by sonography, positive   sonographic Smith sign.  No pericholecystic fluid. These findings are   equivocal for acute cholecystitis, and a HIDA scan may be helpful for   further evaluation as clinically indicated.        < end of copied text >    < from: MR Abdomen w/wo IV Cont (12.30.18 @ 15:51) >    FINDINGS:    Liver: No mass.    Gallbladder and bile ducts:  Stones at the gallbladder. Diffuse wall   edema without luminal distention or pericholecystic inflammation. No   biliary ductal dilatation.  No evidence of choledocholithiasis. Cystic   duct is nondilated.   Pancreas: Unremarkable. No stones. No ductal dilation.    Spleen: Unremarkable. No splenomegaly.    Adrenals: Unremarkable. No mass.    Kidneys and ureters: Unremarkable. No solid mass. No hydronephrosis.    Stomach and bowel: Unremarkable.    Intraperitoneal space: No fluid collection.    Soft tissues: Unremarkable.     IMPRESSION:   Cholelithiasis. Diffuse nonspecific gallbladder wall edema.    No biliary dilatation or choledocholithiasis.      < end of copied text >

## 2018-12-30 NOTE — ED PROVIDER NOTE - ATTENDING CONTRIBUTION TO CARE
I, Dion Phillip DO,  performed the initial face to face bedside interview with this patient regarding history of present illness, review of symptoms and relevant past medical, social and family history.  I completed an independent physical examination.  I was the initial provider who evaluated this patient. I have signed out the follow up of any pending tests (i.e. labs, radiological studies) to the ACP.  I have communicated the patient’s plan of care and disposition with the ACP.

## 2018-12-30 NOTE — ED ADULT NURSE REASSESSMENT NOTE - NS ED NURSE REASSESS COMMENT FT1
Pt to be admitted to hospital for surgery tomorrow. Order placed for clear liquid tray. Pt and mother at bedside updated on plan of care. VSS

## 2018-12-30 NOTE — ED PROVIDER NOTE - OBJECTIVE STATEMENT
32 y/o female with a PMHx of Cholelithiasis, s/p appendectomy presents to the ED c/o abd pain x couple of days. +chills, cannot tolerate PO intake. Pt states that she had one episode of emesis last night after eating something. Pt states that her abd pain has worsened over the past few days, states that her pain used to last 20 minutes but yesterday she states the pain lasted the whole day. Denies dysuria, urinary frequency. LNMP: 3 weeks ago. PCP: Dr. Jovel

## 2018-12-30 NOTE — ED PROVIDER NOTE - NS_ ATTENDINGSCRIBEDETAILS _ED_A_ED_FT
Dion Phillip DO (Attending): The history, relevant review of systems, past medical and surgical history, medical decision making, and physical examination was documented by the scribe in my presence and I attest to the accuracy of the documentation.

## 2018-12-30 NOTE — H&P ADULT - NSHPPHYSICALEXAM_GEN_ALL_CORE
general: appears stated age, well dressed, well nourished   neuro: NAD, AOX3   cv: s1s2, rrr, no m/r/g   pulm: bilateral air entry, non-labored breathing   abd: soft, ND, tender to the ruq, no guarding, non-peritoneal abdmen  ext: FROM, skin WWP, cft < 3 sect

## 2018-12-30 NOTE — ED ADULT NURSE NOTE - NSIMPLEMENTINTERV_GEN_ALL_ED
ED Visit Note


First contact with patient:  09:59


Chief complaint: Left ankle pain.


HPI: This 67-year-old white female presents to the emergency room for 

evaluation of her left ankle.  The patient injured the ankle about an hour ago 

when she walked down 2 steps in her garage.  She slipped and landed awkwardly 

on the left ankle.  She felt a snap and was unable to bear weight on the ankle.

  She states it became swollen immediately. Since that time, they have had 

persistent pain over the medial and lateral portion of the ankle.  She denies 

any numbness or tingling.  no knee or hip pain.  Treatment has consisted of ice 

provided in the ER.  No prior history of significant ankle injury.  Pain is 7/

10.  There is a visible deformity to the ankle.  Her son accompanies her today.





REVIEW OF SYSTEM:


HEENT:  No dizziness, visual problems, hearing loss, tinnitus.  There is no 

difficulty swallowing and no oral lesions are present.


PULMONARY: No cough, shortness of breath, sputum production or hemoptysis.


CARDIOVASCULAR:  No chest pain, palpitations, shortness of breath or peripheral 

edema.


GASTROINTESTINAL:  No diarrhea, constipation, nausea, vomiting, or abdominal 

pain.


GENITOURINARY:  No dysuria, frequency, urgency or nocturia.


NEUROLOGIC:  No weakness, muscle tenderness, epilepsy or history of 

neurological problems. 


MUSCULOSKELETAL: No history of joint tenderness/swelling.  No history of 

arthritis or arthralgias.


SKIN:  No rashes or lesions.


ENDOCRINE:  No history of diabetes, abnormal hair growth.





PAST MEDICAL HISTORY: Supplemental sheet was reviewed and signed.  


Previous surgeries: None


Medical history: Significant for hypertension, GERD, hypothyroidism, elevated 

cholesterol, and osteoarthritis


Current medications: Reviewed and filed in patient's chart


Allergies: Adhesives, citalopram, codeine, gemfibrozil, minocycline, Macrobid, 

Bactrim


Family history: Noncontributory.


Social history: .  Retired.  No tobacco use.





PHYSICAL EXAM:


Vitals: Afebrile.  Reviewed and filed in patient's chart


General: Well-developed, well-nourished, elderly white female, in obvious 

discomfort.  No acute distress.  She is sitting in a wheelchair.  Alert and 

oriented.


Skin:Warm and dry with good turgor.  No rashes or lesions.  No erythema.  The 

patient is not  diaphoretic.  No abrasions. Edema is present in the left ankle 

with mild ecchymosis developing there is a prominence over the tibia anteriorly.


Musculoskeletal: Left ankle evaluation reveals no pain with palpation across 

the knee or proximal tibia or fibula.  There is pain with palpation over the 

lateral malleolus and the lateral ligaments.  There is also pain over the 

medial malleolus and deltoid ligament.  Crepitus is palpable.  Achilles' tendon 

is palpated to its entirety and found to be intact and without defect.  Normal 

Medina test.  No pain with palpation of the calcaneus, fifth metatarsal base, 

midfoot, forefoot, or toes.  Motor function to the toes is intact but causes 

ankle pain.  Drawer and tilt test were not attempted.


Neurologic: Gross sensation is intact across all aspects of the foot and ankle 

via soft touch.  Peripheral pulses are 2+.


Data: Radiographic images of the ankle were obtained today and were reviewed by 

me as well as radiology.  They are positive for trimalleolar fracture 

dislocation of the ankle with posterior and lateral displacement.





IMPRESSION: Left ankle trimalleolar fracture dislocation





PLAN: The patient was educated regarding today's findings.  Conservative care 

measures were discussed.  IV was established.  Labs were obtained.  Patient was 

given Zofran 4 mg IV and morphine 2 mg IV.  I did attempt to reduce the ankle.  

Shift was felt.  The ankle was splinted under my direct supervision while I 

maintained reduction.  The ED tech did assist with the splinting.  

Neurovascular status was checked after splint placement and was adequate.  

Postreduction films confirmed improvement in the AP film, but the ankle 

remained dislocated posteriorly on the lateral.  The splint was removed and the 

patient was given an additional 2 mg morphine IV.  Countertraction was also 

applied at her knee.  I did attempt a second reduction and felt a significant 

shift with visibly better positioning of the ankle.  Postreduction films were 

obtained prior to splint placement.  They confirmed reduction of the ankle on 

the lateral.  Medial lateral positioning also remained adequate.  Splint was 

then reapplied with the assistance of the ED tech.  Neurovascular status was 

again checked and was intact.  


Patient was instructed to be nonweightbearing on the left leg.  She has a 

walker at home.  I would like her to follow up in the office tomorrow for more 

definitive management.  She will likely need surgical intervention.  

Prescription was given for Norco 5 mg to be used every 6 hours as needed for 

severe pain.  She may use Tylenol and Motrin every 6 hours for mild discomfort.

  Gentle toe motion daily.  Ice and elevate intermittently over the next 3 

days.  Lower leg should be elevated at night during sleep. Return to the ER for 

any acute changes.  Possibility of tendon rupture, sprain, and foot fracture 

were also considered.


Problem List


Medical Problems:


(1) Benign hypertension


Status: Chronic  





(2) Chronic Sinusitis Nos


Status: Chronic  





(3) Esophageal Reflux


Status: Chronic  





(4) Gastroesophageal reflux disease


Status: Chronic  





(5) Hyperlipidemia Nec/Nos


Status: Chronic  





(6) Hypertension Nos


Status: Chronic  





(7) Hypothyroidism


Status: Chronic  











Current/Historical Medications


Scheduled


Aspirin Enteric Coated (Ecotrin Or Generic), 81 MG PO DAILY


Atenolol (Tenormin), 50 MG PO DAILY


Calcium Carbonate-Vitamin D (Calcium 600+D3), 1 TAB PO BID


Fenofibrate (Tricor), 160 MG PO DAILY


Fish Oil ([place]-3), 1 CAP PO BID


Hydroxyzine Hcl (Atarax), 25 MG PO DAILY


Levothyroxine Sodium (Levothyroxine Sodium), 112 MCG PO DAILY


Ranitidine (Zantac), 150 MG PO BID





Scheduled PRN


Hydrocodone/Acetaminophen 5MG/325MG (Norco 5MG/325MG), 1-2 TABLET PO Q6H PRN 

for Pain





Allergies


Coded Allergies:  


     Adhesives (Verified  Allergy, Unknown, ., 7/9/15)


     Citalopram (Verified  Allergy, Unknown, UNKNOWN, 7/9/15)


     Codeine (Verified  Allergy, Unknown, ., 7/9/15)


     Gemfibrozil (Verified  Allergy, Unknown, UNKNOWN, 7/9/15)


     Minocycline (Verified  Allergy, Unknown, ., 7/9/15)


     Nitrofurantoin (Verified  Allergy, Unknown, UNKNOWN, 7/9/15)


     Sulfamethoxazole w/Trimethoprim (Verified  Allergy, Unknown, ., 7/9/15)


     POLLEN (Verified  Adverse Reaction, Unknown, UNKNOWN, 7/9/15)


Uncoded Allergies:  


     MOLD (Adverse Reaction, Unknown, UNKNOWN, 10/13/14)





Vital Signs











  Date Time  Temp Pulse Resp B/P (MAP) Pulse Ox O2 Delivery O2 Flow Rate FiO2


 


7/2/17 13:10  68 20 124/83 91   


 


7/2/17 12:00  71 20 146/77 91 Room Air  


 


7/2/17 09:50 36.9 68 16 148/82 93 Room Air  











Laboratory Results


7/2/17 10:46








Red Blood Count 4.93, Mean Corpuscular Volume 89.7, Mean Corpuscular Hemoglobin 

30.2, Mean Corpuscular Hemoglobin Concent 33.7, Mean Platelet Volume 10.8, 

Neutrophils (%) (Auto) 69.3, Lymphocytes (%) (Auto) 17.6, Monocytes (%) (Auto) 

10.3, Eosinophils (%) (Auto) 1.9, Basophils (%) (Auto) 0.7, Neutrophils # (Auto

) 4.09, Lymphocytes # (Auto) 1.04, Monocytes # (Auto) 0.61, Eosinophils # (Auto

) 0.11, Basophils # (Auto) 0.04





7/2/17 10:46

















Test


  7/2/17


10:46


 


White Blood Count


  5.90 K/uL


(4.8-10.8)


 


Red Blood Count


  4.93 M/uL


(4.2-5.4)


 


Hemoglobin


  14.9 g/dL


(12.0-16.0)


 


Hematocrit 44.2 % (37-47) 


 


Mean Corpuscular Volume


  89.7 fL


()


 


Mean Corpuscular Hemoglobin


  30.2 pg


(25-34)


 


Mean Corpuscular Hemoglobin


Concent 33.7 g/dl


(32-36)


 


Platelet Count


  216 K/uL


(130-400)


 


Mean Platelet Volume


  10.8 fL


(7.4-10.4)


 


Neutrophils (%) (Auto) 69.3 % 


 


Lymphocytes (%) (Auto) 17.6 % 


 


Monocytes (%) (Auto) 10.3 % 


 


Eosinophils (%) (Auto) 1.9 % 


 


Basophils (%) (Auto) 0.7 % 


 


Neutrophils # (Auto)


  4.09 K/uL


(1.4-6.5)


 


Lymphocytes # (Auto)


  1.04 K/uL


(1.2-3.4)


 


Monocytes # (Auto)


  0.61 K/uL


(0.11-0.59)


 


Eosinophils # (Auto)


  0.11 K/uL


(0-0.5)


 


Basophils # (Auto)


  0.04 K/uL


(0-0.2)


 


RDW Standard Deviation


  42.4 fL


(36.4-46.3)


 


RDW Coefficient of Variation


  12.9 %


(11.5-14.5)


 


Immature Granulocyte % (Auto) 0.2 % 


 


Immature Granulocyte # (Auto)


  0.01 K/uL


(0.00-0.02)


 


Anion Gap


  9.0 mmol/L


(3-11)


 


Est Creatinine Clear Calc


Drug Dose 62.7 ml/min 


 


 


Estimated GFR (


American) 68.0 


 


 


Estimated GFR (Non-


American 58.7 


 


 


BUN/Creatinine Ratio 22.7 (10-20) 


 


Calcium Level


  10.4 mg/dl


(8.5-10.1)











Medications Administered











 Medications


  (Trade)  Dose


 Ordered  Sig/Mora


 Route  Start Time


 Stop Time Status Last Admin


Dose Admin


 


 Morphine Sulfate


  (MoRPHine


 SULFATE INJ)  2 mg  NOW  STAT


 IV  7/2/17 10:44


 7/2/17 10:47 DC 7/2/17 10:56


2 MG


 


 Ondansetron HCl


  (Zofran Inj)  4 mg  NOW  STAT


 IV  7/2/17 10:44


 7/2/17 10:47 DC 7/2/17 10:56


4 MG


 


 Morphine Sulfate


  (MoRPHine


 SULFATE INJ)  2 mg  NOW  STAT


 IV  7/2/17 12:16


 7/2/17 12:17 DC 7/2/17 12:19


2 MG











Departure Information


Impression





 Primary Impression:  


 Trimalleolar fracture of ankle, closed





Dispostion


Home / Self-Care





Condition


GOOD





Prescriptions





Hydrocodone/Acetaminophen 5MG/325MG (Norco 5MG/325MG)  Tab


1-2 TABLET PO Q6H Y for Pain, #20 TAB


   For Initial Treatment


   Prov: Josse Cordova,P.A.         7/2/17





Referrals


Saad Bradford M.D.





Forms


CARE OF CASTS, HOME CARE DOCUMENTATION FORM,                                  

                              SPECIAL NARCOTICS INSTRUCTIONS, TYLENOL USE, 

IMPORTANT VISIT INFORMATION





Patient Instructions


My [facility]





Additional Instructions





ice and elevate the leg frequently to reduce pain/swelling


norco 1-2 pills every 6 hours as needed for severe pain


substitute tylenol every 6 hours for mild pain


call [facility] tomorrow morning for follow up


keep the splint on and dry at all times


NO WEIGHT on the left leg-use your walker.
Implemented All Universal Safety Interventions:  [place] to call system. Call bell, personal items and telephone within reach. Instruct patient to call for assistance. Room bathroom lighting operational. Non-slip footwear when patient is off stretcher. Physically safe environment: no spills, clutter or unnecessary equipment. Stretcher in lowest position, wheels locked, appropriate side rails in place.

## 2018-12-30 NOTE — ED ADULT NURSE REASSESSMENT NOTE - NS ED NURSE REASSESS COMMENT FT1
Dr Antoine (surgery) evaluated pt at bedside. Pt pending MRI. Pt and mother at bedside updated on plan of care. VSS

## 2018-12-31 ENCOUNTER — RESULT REVIEW (OUTPATIENT)
Age: 33
End: 2018-12-31

## 2018-12-31 DIAGNOSIS — K81.9 CHOLECYSTITIS, UNSPECIFIED: ICD-10-CM

## 2018-12-31 LAB
ALBUMIN SERPL ELPH-MCNC: 3 G/DL — LOW (ref 3.3–5)
ALP SERPL-CCNC: 166 U/L — HIGH (ref 40–120)
ALT FLD-CCNC: 789 U/L — HIGH (ref 12–78)
ANION GAP SERPL CALC-SCNC: 7 MMOL/L — SIGNIFICANT CHANGE UP (ref 5–17)
AST SERPL-CCNC: 219 U/L — HIGH (ref 15–37)
BILIRUB SERPL-MCNC: 1.3 MG/DL — HIGH (ref 0.2–1.2)
BLD GP AB SCN SERPL QL: SIGNIFICANT CHANGE UP
BUN SERPL-MCNC: 8 MG/DL — SIGNIFICANT CHANGE UP (ref 7–23)
CALCIUM SERPL-MCNC: 8.6 MG/DL — SIGNIFICANT CHANGE UP (ref 8.5–10.1)
CHLORIDE SERPL-SCNC: 106 MMOL/L — SIGNIFICANT CHANGE UP (ref 96–108)
CO2 SERPL-SCNC: 26 MMOL/L — SIGNIFICANT CHANGE UP (ref 22–31)
CREAT SERPL-MCNC: 0.91 MG/DL — SIGNIFICANT CHANGE UP (ref 0.5–1.3)
GLUCOSE SERPL-MCNC: 89 MG/DL — SIGNIFICANT CHANGE UP (ref 70–99)
HCT VFR BLD CALC: 34.5 % — SIGNIFICANT CHANGE UP (ref 34.5–45)
HGB BLD-MCNC: 11.7 G/DL — SIGNIFICANT CHANGE UP (ref 11.5–15.5)
MCHC RBC-ENTMCNC: 30 PG — SIGNIFICANT CHANGE UP (ref 27–34)
MCHC RBC-ENTMCNC: 33.9 GM/DL — SIGNIFICANT CHANGE UP (ref 32–36)
MCV RBC AUTO: 88.5 FL — SIGNIFICANT CHANGE UP (ref 80–100)
NRBC # BLD: 0 /100 WBCS — SIGNIFICANT CHANGE UP (ref 0–0)
PLATELET # BLD AUTO: 233 K/UL — SIGNIFICANT CHANGE UP (ref 150–400)
POTASSIUM SERPL-MCNC: 4.3 MMOL/L — SIGNIFICANT CHANGE UP (ref 3.5–5.3)
POTASSIUM SERPL-SCNC: 4.3 MMOL/L — SIGNIFICANT CHANGE UP (ref 3.5–5.3)
PROT SERPL-MCNC: 6 GM/DL — SIGNIFICANT CHANGE UP (ref 6–8.3)
RBC # BLD: 3.9 M/UL — SIGNIFICANT CHANGE UP (ref 3.8–5.2)
RBC # FLD: 13.4 % — SIGNIFICANT CHANGE UP (ref 10.3–14.5)
SODIUM SERPL-SCNC: 139 MMOL/L — SIGNIFICANT CHANGE UP (ref 135–145)
TYPE + AB SCN PNL BLD: SIGNIFICANT CHANGE UP
WBC # BLD: 7.82 K/UL — SIGNIFICANT CHANGE UP (ref 3.8–10.5)
WBC # FLD AUTO: 7.82 K/UL — SIGNIFICANT CHANGE UP (ref 3.8–10.5)

## 2018-12-31 PROCEDURE — 88304 TISSUE EXAM BY PATHOLOGIST: CPT | Mod: 26

## 2018-12-31 RX ORDER — OXYCODONE AND ACETAMINOPHEN 5; 325 MG/1; MG/1
2 TABLET ORAL EVERY 4 HOURS
Qty: 0 | Refills: 0 | Status: DISCONTINUED | OUTPATIENT
Start: 2018-12-31 | End: 2019-01-01

## 2018-12-31 RX ORDER — FENTANYL CITRATE 50 UG/ML
25 INJECTION INTRAVENOUS
Qty: 0 | Refills: 0 | Status: DISCONTINUED | OUTPATIENT
Start: 2018-12-31 | End: 2018-12-31

## 2018-12-31 RX ORDER — SODIUM CHLORIDE 9 MG/ML
1000 INJECTION, SOLUTION INTRAVENOUS
Qty: 0 | Refills: 0 | Status: DISCONTINUED | OUTPATIENT
Start: 2018-12-31 | End: 2018-12-31

## 2018-12-31 RX ORDER — MEPERIDINE HYDROCHLORIDE 50 MG/ML
12.5 INJECTION INTRAMUSCULAR; INTRAVENOUS; SUBCUTANEOUS
Qty: 0 | Refills: 0 | Status: DISCONTINUED | OUTPATIENT
Start: 2018-12-31 | End: 2018-12-31

## 2018-12-31 RX ORDER — MORPHINE SULFATE 50 MG/1
2 CAPSULE, EXTENDED RELEASE ORAL EVERY 4 HOURS
Qty: 0 | Refills: 0 | Status: DISCONTINUED | OUTPATIENT
Start: 2018-12-31 | End: 2019-01-01

## 2018-12-31 RX ORDER — ONDANSETRON 8 MG/1
4 TABLET, FILM COATED ORAL ONCE
Qty: 0 | Refills: 0 | Status: COMPLETED | OUTPATIENT
Start: 2018-12-31 | End: 2018-12-31

## 2018-12-31 RX ORDER — PROCHLORPERAZINE MALEATE 5 MG
10 TABLET ORAL ONCE
Qty: 0 | Refills: 0 | Status: COMPLETED | OUTPATIENT
Start: 2018-12-31 | End: 2018-12-31

## 2018-12-31 RX ORDER — OXYCODONE AND ACETAMINOPHEN 5; 325 MG/1; MG/1
1 TABLET ORAL EVERY 4 HOURS
Qty: 0 | Refills: 0 | Status: DISCONTINUED | OUTPATIENT
Start: 2018-12-31 | End: 2019-01-01

## 2018-12-31 RX ORDER — OXYCODONE HYDROCHLORIDE 5 MG/1
5 TABLET ORAL ONCE
Qty: 0 | Refills: 0 | Status: DISCONTINUED | OUTPATIENT
Start: 2018-12-31 | End: 2018-12-31

## 2018-12-31 RX ORDER — HYDROMORPHONE HYDROCHLORIDE 2 MG/ML
0.5 INJECTION INTRAMUSCULAR; INTRAVENOUS; SUBCUTANEOUS
Qty: 0 | Refills: 0 | Status: DISCONTINUED | OUTPATIENT
Start: 2018-12-31 | End: 2018-12-31

## 2018-12-31 RX ORDER — ONDANSETRON 8 MG/1
4 TABLET, FILM COATED ORAL EVERY 6 HOURS
Qty: 0 | Refills: 0 | Status: DISCONTINUED | OUTPATIENT
Start: 2018-12-31 | End: 2019-01-01

## 2018-12-31 RX ORDER — ENOXAPARIN SODIUM 100 MG/ML
30 INJECTION SUBCUTANEOUS DAILY
Qty: 0 | Refills: 0 | Status: DISCONTINUED | OUTPATIENT
Start: 2018-12-31 | End: 2019-01-01

## 2018-12-31 RX ORDER — SODIUM CHLORIDE 9 MG/ML
1000 INJECTION, SOLUTION INTRAVENOUS
Qty: 0 | Refills: 0 | Status: DISCONTINUED | OUTPATIENT
Start: 2018-12-31 | End: 2019-01-01

## 2018-12-31 RX ADMIN — Medication 200 MILLIGRAM(S): at 17:32

## 2018-12-31 RX ADMIN — HYDROMORPHONE HYDROCHLORIDE 0.5 MILLIGRAM(S): 2 INJECTION INTRAMUSCULAR; INTRAVENOUS; SUBCUTANEOUS at 20:31

## 2018-12-31 RX ADMIN — SODIUM CHLORIDE 75 MILLILITER(S): 9 INJECTION, SOLUTION INTRAVENOUS at 21:06

## 2018-12-31 RX ADMIN — FAMOTIDINE 20 MILLIGRAM(S): 10 INJECTION INTRAVENOUS at 17:32

## 2018-12-31 RX ADMIN — Medication 100 MILLIGRAM(S): at 15:19

## 2018-12-31 RX ADMIN — OXYCODONE HYDROCHLORIDE 5 MILLIGRAM(S): 5 TABLET ORAL at 20:54

## 2018-12-31 RX ADMIN — HYDROMORPHONE HYDROCHLORIDE 0.5 MILLIGRAM(S): 2 INJECTION INTRAMUSCULAR; INTRAVENOUS; SUBCUTANEOUS at 20:45

## 2018-12-31 RX ADMIN — Medication 200 MILLIGRAM(S): at 06:19

## 2018-12-31 RX ADMIN — ONDANSETRON 4 MILLIGRAM(S): 8 TABLET, FILM COATED ORAL at 20:17

## 2018-12-31 RX ADMIN — Medication 10 MILLIGRAM(S): at 20:28

## 2018-12-31 RX ADMIN — HYDROMORPHONE HYDROCHLORIDE 0.5 MILLIGRAM(S): 2 INJECTION INTRAMUSCULAR; INTRAVENOUS; SUBCUTANEOUS at 20:21

## 2018-12-31 RX ADMIN — Medication 100 MILLIGRAM(S): at 05:10

## 2018-12-31 RX ADMIN — MORPHINE SULFATE 2 MILLIGRAM(S): 50 CAPSULE, EXTENDED RELEASE ORAL at 22:52

## 2018-12-31 RX ADMIN — SODIUM CHLORIDE 100 MILLILITER(S): 9 INJECTION INTRAMUSCULAR; INTRAVENOUS; SUBCUTANEOUS at 00:51

## 2018-12-31 RX ADMIN — HYDROMORPHONE HYDROCHLORIDE 0.5 MILLIGRAM(S): 2 INJECTION INTRAMUSCULAR; INTRAVENOUS; SUBCUTANEOUS at 20:11

## 2018-12-31 RX ADMIN — MORPHINE SULFATE 2 MILLIGRAM(S): 50 CAPSULE, EXTENDED RELEASE ORAL at 23:22

## 2018-12-31 NOTE — PROGRESS NOTE ADULT - SUBJECTIVE AND OBJECTIVE BOX
Hospital Day#: 1    The patient is doing well without complaints.    Vital Signs Last 24 Hrs  T(C): 36.9 (31 Dec 2018 12:08), Max: 36.9 (31 Dec 2018 00:59)  T(F): 98.5 (31 Dec 2018 12:08), Max: 98.5 (31 Dec 2018 12:08)  HR: 62 (31 Dec 2018 12:08) (62 - 83)  BP: 105/51 (31 Dec 2018 12:08) (102/57 - 119/72)  BP(mean): --  RR: 16 (31 Dec 2018 12:08) (16 - 18)  SpO2: 99% (31 Dec 2018 12:08) (99% - 100%)    PHYSICAL EXAM:  General: NAD.  HEENT: no JVD, no jaundice.  LUNGS: CTAB.  Heart: S1 S2 RRR  Abd: soft nt/nd                             11.7   7.82  )-----------( 233      ( 31 Dec 2018 06:35 )             34.5       12-31    139  |  106  |  8   ----------------------------<  89  4.3   |  26  |  0.91    Ca    8.6      31 Dec 2018 06:35    TPro  6.0  /  Alb  3.0<L>  /  TBili  1.3<H>  /  DBili  x   /  AST  219<H>  /  ALT  789<H>  /  AlkPhos  166<H>  12-31      PT/INR - ( 30 Dec 2018 09:48 )   PT: 13.1 sec;   INR: 1.17 ratio      EXAM:  MR ABDOMEN WAW IC                            PROCEDURE DATE:  12/30/2018          INTERPRETATION:  MR ABDOMEN WAW IC    HISTORY: Right upper quadrant pain with gallstones and positive Smith   sign    EXAM DATE/TIME:    12/30/2018 3:06 PM     CLINICAL HISTORY:    33 years old, female; Pain; Abdominal pain; Generalized     TECHNIQUE:    MR of the abdomen without and with intravenous contrast.    MIP reconstructed images were created and reviewed.     CONTRAST:    6.5 ml of Gadavist administered intravenously.     COMPARISON:    US ABDOMEN LIMITED 12/30/2018 10:53 AM     FINDINGS:    Liver: No mass.    Gallbladder and bile ducts:  Stones at the gallbladder. Diffuse wall   edema without luminal distention or pericholecystic inflammation. No   biliary ductal dilatation.  No evidence of choledocholithiasis. Cystic   duct is nondilated.   Pancreas: Unremarkable. No stones. No ductal dilation.    Spleen: Unremarkable. No splenomegaly.    Adrenals: Unremarkable. No mass.    Kidneys and ureters: Unremarkable. No solid mass. No hydronephrosis.    Stomach and bowel: Unremarkable.    Intraperitoneal space: No fluid collection.    Soft tissues: Unremarkable.     IMPRESSION:   Cholelithiasis. Diffuse nonspecific gallbladder wall edema.    No biliary dilatation or choledocholithiasis.         PTT - ( 30 Dec 2018 09:48 )  PTT:33.5 sec

## 2018-12-31 NOTE — BRIEF OPERATIVE NOTE - PROCEDURE
<<-----Click on this checkbox to enter Procedure Cholecystectomy, laparoscopic  12/31/2018    Active  AGODWIN

## 2019-01-01 ENCOUNTER — TRANSCRIPTION ENCOUNTER (OUTPATIENT)
Age: 34
End: 2019-01-01

## 2019-01-01 VITALS
TEMPERATURE: 98 F | HEART RATE: 67 BPM | OXYGEN SATURATION: 100 % | RESPIRATION RATE: 18 BRPM | DIASTOLIC BLOOD PRESSURE: 60 MMHG | SYSTOLIC BLOOD PRESSURE: 117 MMHG

## 2019-01-01 RX ADMIN — OXYCODONE AND ACETAMINOPHEN 2 TABLET(S): 5; 325 TABLET ORAL at 11:05

## 2019-01-01 RX ADMIN — OXYCODONE AND ACETAMINOPHEN 2 TABLET(S): 5; 325 TABLET ORAL at 07:41

## 2019-01-01 RX ADMIN — OXYCODONE AND ACETAMINOPHEN 2 TABLET(S): 5; 325 TABLET ORAL at 02:24

## 2019-01-01 RX ADMIN — OXYCODONE AND ACETAMINOPHEN 2 TABLET(S): 5; 325 TABLET ORAL at 07:11

## 2019-01-01 RX ADMIN — OXYCODONE AND ACETAMINOPHEN 2 TABLET(S): 5; 325 TABLET ORAL at 11:35

## 2019-01-01 RX ADMIN — OXYCODONE AND ACETAMINOPHEN 2 TABLET(S): 5; 325 TABLET ORAL at 02:54

## 2019-01-01 NOTE — DISCHARGE NOTE ADULT - CARE PROVIDER_API CALL
Gustavo Sharif (MD), Surgery  [address] [facility]  [address] [place] [address]  Phone: [phone]  Fax: [phone]

## 2019-01-01 NOTE — DISCHARGE NOTE ADULT - MEDICATION SUMMARY - MEDICATIONS TO TAKE
I will START or STAY ON the medications listed below when I get home from the hospital:    Percocet 5/325 oral tablet  -- 1-2  tab(s) by mouth every 4-6 hours, As Needed -for moderate pain MDD:4gm  -- Caution federal law prohibits the transfer of this drug to any person other  than the person for whom it was prescribed.  May cause drowsiness.  Alcohol may intensify this effect.  Use care when operating dangerous machinery.  This prescription cannot be refilled.  This product contains acetaminophen.  Do not use  with any other product containing acetaminophen to prevent possible liver damage.  Using more of this medication than prescribed may cause serious breathing problems.    -- Indication: For CHOLECYSTITIS

## 2019-01-01 NOTE — DISCHARGE NOTE ADULT - PATIENT PORTAL LINK FT
You can access the [facility] Patient Portal, offered by [facility], by registering with the following website: [email or web address]

## 2019-01-01 NOTE — PROGRESS NOTE ADULT - SUBJECTIVE AND OBJECTIVE BOX
Post Op Day#: 1  Procedure:  Laparoscopic cholecystectomy    The patient is doing well without complaints.    Vital Signs Last 24 Hrs  T(C): 37 (01 Jan 2019 06:18), Max: 37 (01 Jan 2019 06:18)  T(F): 98.6 (01 Jan 2019 06:18), Max: 98.6 (01 Jan 2019 06:18)  HR: 65 (01 Jan 2019 06:18) (60 - 92)  BP: 101/52 (01 Jan 2019 06:18) (91/67 - 129/79)  BP(mean): 62 (31 Dec 2018 17:34) (62 - 62)  RR: 18 (01 Jan 2019 06:18) (12 - 24)  SpO2: 99% (01 Jan 2019 06:18) (95% - 100%)    PHYSICAL EXAM:  General: NAD.  HEENT: no JVD, no jaundice.  LUNGS: CTAB.  Heart: S1 S2 RRR  Abd: soft nt/nd   Wounds: clean dry and intact                          11.7   7.82  )-----------( 233      ( 31 Dec 2018 06:35 )             34.5       12-31    139  |  106  |  8   ----------------------------<  89  4.3   |  26  |  0.91    Ca    8.6      31 Dec 2018 06:35    TPro  6.0  /  Alb  3.0<L>  /  TBili  1.3<H>  /  DBili  x   /  AST  219<H>  /  ALT  789<H>  /  AlkPhos  166<H>  12-31

## 2019-01-01 NOTE — DISCHARGE NOTE ADULT - HOSPITAL COURSE
Patient is an 34 yo F who presented to [facility] ER with complaints of abdominal pain. Work-up revealed acute cholecystitis. Patient then underwent successful laparoscopic cholecystectomy without any complications. Patient is currently tolerating diet and pain is controlled with oral medication. Patient has had uncomplicated hospital course and is stable for discharge home. Patient been instructed to follow-up in office in 2 weeks.

## 2019-01-01 NOTE — DISCHARGE NOTE ADULT - CARE PLAN
Principal Discharge DX:	Cholecystitis  Goal:	Recover from surgery  Assessment and plan of treatment:	Instruction given to patient

## 2019-01-03 LAB — SURGICAL PATHOLOGY FINAL REPORT - CH: SIGNIFICANT CHANGE UP

## 2019-01-04 DIAGNOSIS — K80.00 CALCULUS OF GALLBLADDER WITH ACUTE CHOLECYSTITIS WITHOUT OBSTRUCTION: ICD-10-CM

## 2019-12-17 NOTE — PATIENT PROFILE ADULT - BRADEN ACTIVITY
(4) walks frequently decreased miles/decreased step length/decreased weight-shifting ability/increased time in double stance

## 2021-10-28 NOTE — ED ADULT NURSE NOTE - NS ED NURSE LEVEL OF CONSCIOUSNESS AFFECT
ASPIRIN and NSAID PRODUCTS    The following is a list of medications that either contain aspirin or nonsteroidal anti-inflammatory agents (NSAID's). OVER-THE-COUNTER:  Do not take five (5) days prior to procedure. Do not take for two (2) weeks after procedure unless otherwise instructed. Â· Aspirin (generic):  Brand Names:  Anacin, Mary Lou, [address], Aspergum, Aspercin, Aspermin, Aspertab, Back-quell, Duradyne, DORROUGHBY, Bixby, [place], [place], Magnaprin, McNess Pain Tab, Momentum, P-A-C, Pain Reliever Tabs, Tri-Pain Caplets, Vanquish Caplet. Â· Buffered Aspirin (generic):  Brand Names:  Ascriptin, Bufferin, Ecotrin, Buffaprin, Buffasal, Buffinol, COPE. Â· BC Powders/Tablets  Â· Goody's Powders  Â· Stanback Powders or Tablets  Â· Excedrin, Excedrin Extra, Excedrin IB  Â· Jonna [place] or Bromo-[place]  Â· Ibuprofen (generic):  Brand Names: Advil, Nuprin, Motrin IB, Adapin, Genpril, Ibufen 200, Menadol, Midol IB, Dristan Sinus, Ursinus Inlay Tabs, Dimetapp Sinus, Valparin, Haltran Tabs, Cuba's Pills, Connor. Â· Aspirin Suppositories (generic, any strength)  Â· Naproxen (generic)  Brand Name: Aleve  Â· Pepto Bismol    PRESCRIPTION:  Brand Name Generic Name    Fiorinal  butalbital, aspirin, caffeine    Naprosyn, Anaprox  naproxen    Voltaren, Cataflam  diclofenac    Feldene  piroxicam    Motrin (Rx), Rufen  ibuprofen    Ansaid  flurbiprofen    Orudis  ketoprofen    Dolobid  diflunisal    Clinoril  sulindac    Indocin  indomethacin    Tolectin  tolmetin    Azdone Tabs  aspirin plus hydrocodone    Percodan  aspirin plus oxycodone    Synalgos  aspirin plus dihydrocodeine    Daypro  oxaprozin    Lodine  etodolac    Mobic  meloxicam    Meclomen  meclofenamate    Nalfon  fenoprofen    Ponstel (mefenamic acid)     Relafen  nabumetone    Toradol  ketorolac     If you have a headache, backache, arthritis or other painful condition, please take Tylenol, Datril or some other non aspirin-containing product.        Interactions with Herbs and Dietary Supplements      Ginkgo biloba    Garlic    Saw palmetto    Alfalfa    American ginseng    Hannah    Anise    Arnica montana    Asafetida    [place] bark    Bilberry    Birch    Black cohosh    Bladderwrack    Bogbean    Boldo    Borage seed oil    Bromelain    Capsicum    Cat's claw    Celery     Chamomile    Garland    Clove    Coleus    Cordyceps       Dandelion     Danshen    Devil's claw    Dong quai    EPA (eicosapentaenoic    acid, found in fish oils)    Evening primrose oil    Fenugreek    Feverfew    Fish oil    Flaxseed/flax powder (not a    concern with flaxseed oil)    Sujata    Grapefruit juice    Grapeseed    Green tea    Guggul    Gymnestra    Horse chestnut    Horseradish    Licorie root    Lovage root    Male fern    Britton    Melatonin    Nordihydroguairetic acid    (NDGA)   Omega-3 fatty acids    Onion    Papain    Panax ginseng    Parsley    Passionflower    Poplar    Prickly carissa    Propolis    Quassia    Red clover    Reishi    Siberian ginseng    Sweet clover    Rue    Sweet birch    Turmeric    Vitamin E    White willow    [address] Calm

## 2022-12-07 NOTE — ED ADULT NURSE NOTE - CHPI ED NUR SYMPTOMS NEG
no vomiting/no fever/no diaphoresis/no shortness of breath/no dizziness/no nausea/no syncope/no chills/no congestion
Routine observation

## 2023-02-21 NOTE — ED PROVIDER NOTE - DISCUSSED CLINICAL AND RADIOLOGICAL FINDINGS WITH, MDM
patient Methotrexate Pregnancy And Lactation Text: This medication is Pregnancy Category X and is known to cause fetal harm. This medication is excreted in breast milk.

## 2023-08-22 NOTE — DISCHARGE NOTE ADULT - REASON FOR REFUSAL (REFER PATIENT TO HEALTHCARE PROVIDER FOR FOLLOW-UP):
Muscle Hinge Flap Text: Due to the deep nature of the defect, and to ensure survival of the overlying flap or graft repair for cutaneous coverage, the wound was first addressed with a muscle hinge flap.  Three sides of the muscle were incised and flipped over like a page of the book into the defect, and secured with Vicryl stitches. Pt. will followup with PCP

## 2024-04-15 PROBLEM — K80.20 CALCULUS OF GALLBLADDER WITHOUT CHOLECYSTITIS WITHOUT OBSTRUCTION: Chronic | Status: ACTIVE | Noted: 2019-01-03

## 2024-04-19 ENCOUNTER — RESULT CHARGE (OUTPATIENT)
Age: 39
End: 2024-04-19

## 2024-04-19 ENCOUNTER — APPOINTMENT (OUTPATIENT)
Dept: OBGYN | Facility: CLINIC | Age: 39
End: 2024-04-19
Payer: COMMERCIAL

## 2024-04-19 VITALS
SYSTOLIC BLOOD PRESSURE: 117 MMHG | DIASTOLIC BLOOD PRESSURE: 78 MMHG | HEART RATE: 75 BPM | BODY MASS INDEX: 23.46 KG/M2 | WEIGHT: 146 LBS | HEIGHT: 66 IN

## 2024-04-19 LAB
HCG UR QL: POSITIVE
QUALITY CONTROL: YES

## 2024-04-19 PROCEDURE — 81025 URINE PREGNANCY TEST: CPT

## 2024-04-22 ENCOUNTER — APPOINTMENT (OUTPATIENT)
Dept: ANTEPARTUM | Facility: CLINIC | Age: 39
End: 2024-04-22
Payer: COMMERCIAL

## 2024-04-22 ENCOUNTER — ASOB RESULT (OUTPATIENT)
Age: 39
End: 2024-04-22

## 2024-04-22 PROCEDURE — 76801 OB US < 14 WKS SINGLE FETUS: CPT

## 2024-04-26 ENCOUNTER — NON-APPOINTMENT (OUTPATIENT)
Age: 39
End: 2024-04-26

## 2024-05-02 ENCOUNTER — NON-APPOINTMENT (OUTPATIENT)
Age: 39
End: 2024-05-02

## 2024-05-03 ENCOUNTER — APPOINTMENT (OUTPATIENT)
Dept: OBGYN | Facility: CLINIC | Age: 39
End: 2024-05-03
Payer: COMMERCIAL

## 2024-05-03 VITALS
DIASTOLIC BLOOD PRESSURE: 72 MMHG | BODY MASS INDEX: 23.46 KG/M2 | WEIGHT: 146 LBS | HEIGHT: 66 IN | HEART RATE: 76 BPM | SYSTOLIC BLOOD PRESSURE: 115 MMHG

## 2024-05-03 LAB
BILIRUB UR QL STRIP: NORMAL
CLARITY UR: CLEAR
COLLECTION METHOD: NORMAL
GLUCOSE UR-MCNC: NORMAL
HCG UR QL: 0.2 EU/DL
HGB UR QL STRIP.AUTO: NORMAL
KETONES UR-MCNC: NORMAL
LEUKOCYTE ESTERASE UR QL STRIP: NORMAL
NITRITE UR QL STRIP: NORMAL
PH UR STRIP: 7
PROT UR STRIP-MCNC: NORMAL
SP GR UR STRIP: 1.02

## 2024-05-03 PROCEDURE — ZZZZZ: CPT

## 2024-05-03 PROCEDURE — 99213 OFFICE O/P EST LOW 20 MIN: CPT | Mod: 25

## 2024-05-05 ENCOUNTER — NON-APPOINTMENT (OUTPATIENT)
Age: 39
End: 2024-05-05

## 2024-05-06 LAB
C TRACH RRNA SPEC QL NAA+PROBE: NOT DETECTED
N GONORRHOEA RRNA SPEC QL NAA+PROBE: NOT DETECTED
SOURCE AMPLIFICATION: NORMAL

## 2024-05-07 ENCOUNTER — RX RENEWAL (OUTPATIENT)
Age: 39
End: 2024-05-07

## 2024-05-07 LAB — CYTOLOGY CVX/VAG DOC THIN PREP: NORMAL

## 2024-05-08 ENCOUNTER — NON-APPOINTMENT (OUTPATIENT)
Age: 39
End: 2024-05-08

## 2024-05-09 RX ORDER — ONDANSETRON 4 MG/1
4 TABLET, ORALLY DISINTEGRATING ORAL
Qty: 18 | Refills: 1 | Status: ACTIVE | COMMUNITY
Start: 2024-04-19 | End: 1900-01-01

## 2024-05-14 ENCOUNTER — APPOINTMENT (OUTPATIENT)
Dept: OBGYN | Facility: CLINIC | Age: 39
End: 2024-05-14
Payer: COMMERCIAL

## 2024-05-14 ENCOUNTER — APPOINTMENT (OUTPATIENT)
Dept: ANTEPARTUM | Facility: CLINIC | Age: 39
End: 2024-05-14
Payer: COMMERCIAL

## 2024-05-14 ENCOUNTER — ASOB RESULT (OUTPATIENT)
Age: 39
End: 2024-05-14

## 2024-05-14 VITALS
WEIGHT: 148 LBS | HEART RATE: 74 BPM | SYSTOLIC BLOOD PRESSURE: 113 MMHG | DIASTOLIC BLOOD PRESSURE: 72 MMHG | BODY MASS INDEX: 23.89 KG/M2

## 2024-05-14 LAB
BILIRUB UR QL STRIP: NEGATIVE
CLARITY UR: CLEAR
COLLECTION METHOD: NORMAL
GLUCOSE UR-MCNC: NEGATIVE
HCG UR QL: 0 EU/DL
HGB UR QL STRIP.AUTO: NEGATIVE
KETONES UR-MCNC: NEGATIVE
LEUKOCYTE ESTERASE UR QL STRIP: NEGATIVE
NITRITE UR QL STRIP: NEGATIVE
PH UR STRIP: 6
PROT UR STRIP-MCNC: NEGATIVE
SP GR UR STRIP: 1

## 2024-05-14 PROCEDURE — 99212 OFFICE O/P EST SF 10 MIN: CPT

## 2024-05-14 PROCEDURE — ZZZZZ: CPT

## 2024-05-14 PROCEDURE — 76813 OB US NUCHAL MEAS 1 GEST: CPT

## 2024-05-15 ENCOUNTER — APPOINTMENT (OUTPATIENT)
Dept: OBGYN | Facility: CLINIC | Age: 39
End: 2024-05-15

## 2024-05-21 LAB
ADDITIONAL US: NORMAL
CRL SCAN TWIN B: NORMAL
CRL SCAN: NORMAL
CROWN RUMP LENGTH TWIN B: NORMAL
CROWN RUMP LENGTH: 58.6 MM
DIA MOM: 1.57
DIA VALUE: 392 PG/ML
DOWN SYNDROME AGE RISK: NORMAL
DOWN SYNDROME INTERPRETATION: NORMAL
DOWN SYNDROME SCREENING RISK: NORMAL
FIRST TRIMESTER SCREEN COMMENTS: NORMAL
FIRST TRIMESTER SCREEN NOTE: NORMAL
FIRST TRIMESTER SCREEN RESULTS: NORMAL
FIRST TRIMESTER SCREEN TEST RESULTS: NORMAL
GEST. AGE ON COLLECTION DATE: 12 WEEKS
HCG MOM: 1.58
HCG VALUE: 159.5 IU/ML
MATERNAL AGE AT EDD: 38.2 YR
NT MOM TWIN B: NORMAL
NT TWIN B: NORMAL
NUCHAL TRANSLUCENCY (NT): 1.1 MM
NUCHAL TRANSLUCENCY MOM: 0.85
NUMBER OF FETUSES: 1
PAPP-A MOM: 1.13
PAPP-A VALUE: 925.3 NG/ML
RACE: NORMAL
SONOGRAPHER ID#: NORMAL
TRISOMY 18 AGE RISK: NORMAL
TRISOMY 18 INTERPRETATION: NORMAL
TRISOMY 18 SCREENING RISK: NORMAL
WEIGHT AFP: 148 LBS

## 2024-06-10 ENCOUNTER — NON-APPOINTMENT (OUTPATIENT)
Age: 39
End: 2024-06-10

## 2024-06-11 ENCOUNTER — APPOINTMENT (OUTPATIENT)
Dept: OBGYN | Facility: CLINIC | Age: 39
End: 2024-06-11
Payer: COMMERCIAL

## 2024-06-11 VITALS
HEART RATE: 88 BPM | DIASTOLIC BLOOD PRESSURE: 80 MMHG | WEIGHT: 156 LBS | BODY MASS INDEX: 25.18 KG/M2 | SYSTOLIC BLOOD PRESSURE: 132 MMHG

## 2024-06-11 DIAGNOSIS — Z34.90 ENCOUNTER FOR SUPERVISION OF NORMAL PREGNANCY, UNSPECIFIED, UNSPECIFIED TRIMESTER: ICD-10-CM

## 2024-06-11 PROCEDURE — ZZZZZ: CPT

## 2024-06-11 PROCEDURE — 99212 OFFICE O/P EST SF 10 MIN: CPT

## 2024-06-17 ENCOUNTER — NON-APPOINTMENT (OUTPATIENT)
Age: 39
End: 2024-06-17

## 2024-06-17 LAB
ADDITIONAL US: NORMAL
AFP MOM: 1.39
AFP VALUE: 43 NG/ML
COLLECTED ON 2: NORMAL
COLLECTED ON: NORMAL
CRL SCAN TWIN B: NORMAL
CRL SCAN: NORMAL
CROWN RUMP LENGTH TWIN B: NORMAL
CROWN RUMP LENGTH: 58.6 MM
DIA MOM: 1.47
DIA VALUE: 223.8 PG/ML
DOWN SYNDROME AGE RISK: NORMAL
DOWN SYNDROME INTERPRETATION: NORMAL
DOWN SYNDROME SCREENING RISK: NORMAL
FIRST TRIMESTER SAMPLE: NORMAL
GEST. AGE ON COLLECTION DATE: 12 WEEKS
GESTATIONAL AGE: 16 WEEKS
HCG MOM: 2.42
HCG VALUE: 89.4 IU/ML
INSULIN DEP DIABETES: NO
MATERNAL AGE AT EDD: 38.2 YR
NT MOM TWIN B: NORMAL
NT TWIN B: NORMAL
NUCHAL TRANSLUCENCY (NT): 1.1 MM
NUCHAL TRANSLUCENCY MOM: 0.85
NUMBER OF FETUSES: 1
OPEN SPINA BIFIDA: NORMAL
OSB INTERPRETATION: NORMAL
PAPP-A MOM: 1.11
PAPP-A VALUE: 925.3 NG/ML
RACE: NORMAL
SECOND TRIMESTER SAMPLE: NORMAL
SEQUENTIAL 2 COMMENTS: NORMAL
SEQUENTIAL 2 NOTE: NORMAL
SEQUENTIAL 2 RESULTS: NORMAL
SEQUENTIAL 2 TEST RESULTS: NORMAL
SONOGRAPHER ID#: NORMAL
TRISOMY 18 AGE RISK: NORMAL
TRISOMY 18 INTERPRETATION: NORMAL
TRISOMY 18 SCREENING RISK: NORMAL
UE3 MOM: 0.85
UE3 VALUE: 0.8 NG/ML
WEIGHT AFP: 146 LBS
WEIGHT: 156 LBS

## 2024-06-24 ENCOUNTER — NON-APPOINTMENT (OUTPATIENT)
Age: 39
End: 2024-06-24

## 2024-07-09 ENCOUNTER — APPOINTMENT (OUTPATIENT)
Dept: ANTEPARTUM | Facility: CLINIC | Age: 39
End: 2024-07-09

## 2024-07-09 ENCOUNTER — ASOB RESULT (OUTPATIENT)
Age: 39
End: 2024-07-09

## 2024-07-09 ENCOUNTER — APPOINTMENT (OUTPATIENT)
Dept: OBGYN | Facility: CLINIC | Age: 39
End: 2024-07-09
Payer: COMMERCIAL

## 2024-07-09 VITALS
SYSTOLIC BLOOD PRESSURE: 102 MMHG | WEIGHT: 163 LBS | HEART RATE: 68 BPM | DIASTOLIC BLOOD PRESSURE: 67 MMHG | BODY MASS INDEX: 26.31 KG/M2

## 2024-07-09 DIAGNOSIS — Z34.90 ENCOUNTER FOR SUPERVISION OF NORMAL PREGNANCY, UNSPECIFIED, UNSPECIFIED TRIMESTER: ICD-10-CM

## 2024-07-09 LAB
BILIRUB UR QL STRIP: NEGATIVE
CLARITY UR: CLEAR
COLLECTION METHOD: NORMAL
GLUCOSE UR-MCNC: NEGATIVE
HCG UR QL: 0 EU/DL
HGB UR QL STRIP.AUTO: NEGATIVE
KETONES UR-MCNC: NEGATIVE
NITRITE UR QL STRIP: NEGATIVE
PH UR STRIP: 6
PROT UR STRIP-MCNC: NEGATIVE
SP GR UR STRIP: 1

## 2024-07-09 PROCEDURE — 76811 OB US DETAILED SNGL FETUS: CPT

## 2024-07-09 PROCEDURE — 76817 TRANSVAGINAL US OBSTETRIC: CPT

## 2024-07-09 PROCEDURE — 99212 OFFICE O/P EST SF 10 MIN: CPT

## 2024-07-19 ENCOUNTER — ASOB RESULT (OUTPATIENT)
Age: 39
End: 2024-07-19

## 2024-07-19 ENCOUNTER — APPOINTMENT (OUTPATIENT)
Dept: ANTEPARTUM | Facility: CLINIC | Age: 39
End: 2024-07-19
Payer: COMMERCIAL

## 2024-07-19 PROCEDURE — 76816 OB US FOLLOW-UP PER FETUS: CPT

## 2024-08-05 ENCOUNTER — APPOINTMENT (OUTPATIENT)
Dept: OBGYN | Facility: CLINIC | Age: 39
End: 2024-08-05

## 2024-08-05 PROCEDURE — ZZZZZ: CPT

## 2024-08-05 PROCEDURE — 99212 OFFICE O/P EST SF 10 MIN: CPT

## 2024-08-29 ENCOUNTER — NON-APPOINTMENT (OUTPATIENT)
Age: 39
End: 2024-08-29

## 2024-09-03 ENCOUNTER — NON-APPOINTMENT (OUTPATIENT)
Age: 39
End: 2024-09-03

## 2024-09-04 ENCOUNTER — NON-APPOINTMENT (OUTPATIENT)
Age: 39
End: 2024-09-04

## 2024-09-04 ENCOUNTER — ASOB RESULT (OUTPATIENT)
Age: 39
End: 2024-09-04

## 2024-09-04 ENCOUNTER — APPOINTMENT (OUTPATIENT)
Dept: OBGYN | Facility: CLINIC | Age: 39
End: 2024-09-04
Payer: COMMERCIAL

## 2024-09-04 ENCOUNTER — APPOINTMENT (OUTPATIENT)
Dept: ANTEPARTUM | Facility: CLINIC | Age: 39
End: 2024-09-04
Payer: COMMERCIAL

## 2024-09-04 VITALS
SYSTOLIC BLOOD PRESSURE: 106 MMHG | BODY MASS INDEX: 29.21 KG/M2 | DIASTOLIC BLOOD PRESSURE: 67 MMHG | WEIGHT: 181 LBS | HEART RATE: 78 BPM

## 2024-09-04 LAB
BILIRUB UR QL STRIP: NEGATIVE
CLARITY UR: CLEAR
COLLECTION METHOD: NORMAL
GLUCOSE UR-MCNC: NEGATIVE
HCG UR QL: 0.2 EU/DL
HGB UR QL STRIP.AUTO: NEGATIVE
KETONES UR-MCNC: NEGATIVE
LEUKOCYTE ESTERASE UR QL STRIP: NEGATIVE
NITRITE UR QL STRIP: NEGATIVE
PH UR STRIP: 8
PROT UR STRIP-MCNC: NEGATIVE
SP GR UR STRIP: 1.01

## 2024-09-04 PROCEDURE — 90471 IMMUNIZATION ADMIN: CPT

## 2024-09-04 PROCEDURE — 76816 OB US FOLLOW-UP PER FETUS: CPT

## 2024-09-04 PROCEDURE — 76821 MIDDLE CEREBRAL ARTERY ECHO: CPT | Mod: 59

## 2024-09-04 PROCEDURE — 76820 UMBILICAL ARTERY ECHO: CPT | Mod: 59

## 2024-09-04 PROCEDURE — 99212 OFFICE O/P EST SF 10 MIN: CPT | Mod: 25

## 2024-09-04 PROCEDURE — 90715 TDAP VACCINE 7 YRS/> IM: CPT

## 2024-09-05 ENCOUNTER — NON-APPOINTMENT (OUTPATIENT)
Age: 39
End: 2024-09-05

## 2024-09-09 ENCOUNTER — NON-APPOINTMENT (OUTPATIENT)
Age: 39
End: 2024-09-09

## 2024-09-12 ENCOUNTER — NON-APPOINTMENT (OUTPATIENT)
Age: 39
End: 2024-09-12

## 2024-09-17 ENCOUNTER — NON-APPOINTMENT (OUTPATIENT)
Age: 39
End: 2024-09-17

## 2024-09-23 ENCOUNTER — NON-APPOINTMENT (OUTPATIENT)
Age: 39
End: 2024-09-23

## 2024-09-24 ENCOUNTER — APPOINTMENT (OUTPATIENT)
Dept: ANTEPARTUM | Facility: CLINIC | Age: 39
End: 2024-09-24
Payer: COMMERCIAL

## 2024-09-24 ENCOUNTER — APPOINTMENT (OUTPATIENT)
Dept: OBGYN | Facility: CLINIC | Age: 39
End: 2024-09-24
Payer: COMMERCIAL

## 2024-09-24 ENCOUNTER — ASOB RESULT (OUTPATIENT)
Age: 39
End: 2024-09-24

## 2024-09-24 VITALS
HEART RATE: 89 BPM | WEIGHT: 185 LBS | DIASTOLIC BLOOD PRESSURE: 72 MMHG | SYSTOLIC BLOOD PRESSURE: 115 MMHG | BODY MASS INDEX: 29.86 KG/M2

## 2024-09-24 DIAGNOSIS — Z34.90 ENCOUNTER FOR SUPERVISION OF NORMAL PREGNANCY, UNSPECIFIED, UNSPECIFIED TRIMESTER: ICD-10-CM

## 2024-09-24 LAB
BILIRUB UR QL STRIP: NORMAL
CLARITY UR: CLEAR
COLLECTION METHOD: NORMAL
GLUCOSE UR-MCNC: NEGATIVE
HCG UR QL: 0 EU/DL
HGB UR QL STRIP.AUTO: NEGATIVE
KETONES UR-MCNC: NEGATIVE
LEUKOCYTE ESTERASE UR QL STRIP: NEGATIVE
NITRITE UR QL STRIP: NEGATIVE
PH UR STRIP: 6
PROT UR STRIP-MCNC: NORMAL
SP GR UR STRIP: 1

## 2024-09-24 PROCEDURE — 76820 UMBILICAL ARTERY ECHO: CPT

## 2024-09-24 PROCEDURE — 76821 MIDDLE CEREBRAL ARTERY ECHO: CPT

## 2024-09-24 PROCEDURE — 76819 FETAL BIOPHYS PROFIL W/O NST: CPT

## 2024-09-24 PROCEDURE — 99212 OFFICE O/P EST SF 10 MIN: CPT

## 2024-09-25 ENCOUNTER — NON-APPOINTMENT (OUTPATIENT)
Age: 39
End: 2024-09-25

## 2024-09-25 LAB
HIV1+2 AB SPEC QL IA.RAPID: NONREACTIVE
T PALLIDUM AB SER QL IA: NEGATIVE

## 2024-09-30 ENCOUNTER — NON-APPOINTMENT (OUTPATIENT)
Age: 39
End: 2024-09-30

## 2024-10-02 ENCOUNTER — APPOINTMENT (OUTPATIENT)
Dept: OBGYN | Facility: CLINIC | Age: 39
End: 2024-10-02
Payer: COMMERCIAL

## 2024-10-02 ENCOUNTER — APPOINTMENT (OUTPATIENT)
Dept: ANTEPARTUM | Facility: CLINIC | Age: 39
End: 2024-10-02
Payer: COMMERCIAL

## 2024-10-02 ENCOUNTER — ASOB RESULT (OUTPATIENT)
Age: 39
End: 2024-10-02

## 2024-10-02 VITALS — SYSTOLIC BLOOD PRESSURE: 92 MMHG | BODY MASS INDEX: 30.51 KG/M2 | DIASTOLIC BLOOD PRESSURE: 64 MMHG | WEIGHT: 189 LBS

## 2024-10-02 LAB
BILIRUB UR QL STRIP: NORMAL
CLARITY UR: CLEAR
COLLECTION METHOD: NORMAL
GLUCOSE UR-MCNC: NORMAL
HCG UR QL: 0.2 EU/DL
HGB UR QL STRIP.AUTO: NORMAL
KETONES UR-MCNC: NORMAL
LEUKOCYTE ESTERASE UR QL STRIP: NORMAL
NITRITE UR QL STRIP: NORMAL
PH UR STRIP: 6
PROT UR STRIP-MCNC: NORMAL
SP GR UR STRIP: 1.02

## 2024-10-02 PROCEDURE — 99212 OFFICE O/P EST SF 10 MIN: CPT

## 2024-10-02 PROCEDURE — 76816 OB US FOLLOW-UP PER FETUS: CPT

## 2024-10-02 PROCEDURE — 76820 UMBILICAL ARTERY ECHO: CPT | Mod: 59

## 2024-10-02 PROCEDURE — 76821 MIDDLE CEREBRAL ARTERY ECHO: CPT | Mod: 59

## 2024-10-02 PROCEDURE — 76818 FETAL BIOPHYS PROFILE W/NST: CPT

## 2024-10-03 ENCOUNTER — TRANSCRIPTION ENCOUNTER (OUTPATIENT)
Age: 39
End: 2024-10-03

## 2024-10-03 PROBLEM — O28.3 ABNORMAL FETAL ULTRASOUND: Status: ACTIVE | Noted: 2024-10-03

## 2024-10-04 ENCOUNTER — NON-APPOINTMENT (OUTPATIENT)
Age: 39
End: 2024-10-04

## 2024-10-04 PROBLEM — O40.3XX0 POLYHYDRAMNIOS IN THIRD TRIMESTER: Status: ACTIVE | Noted: 2024-10-04

## 2024-10-07 ENCOUNTER — ASOB RESULT (OUTPATIENT)
Age: 39
End: 2024-10-07

## 2024-10-07 ENCOUNTER — APPOINTMENT (OUTPATIENT)
Dept: ANTEPARTUM | Facility: CLINIC | Age: 39
End: 2024-10-07
Payer: COMMERCIAL

## 2024-10-07 ENCOUNTER — APPOINTMENT (OUTPATIENT)
Dept: OBGYN | Facility: CLINIC | Age: 39
End: 2024-10-07
Payer: COMMERCIAL

## 2024-10-07 VITALS — DIASTOLIC BLOOD PRESSURE: 70 MMHG | WEIGHT: 190 LBS | BODY MASS INDEX: 30.67 KG/M2 | SYSTOLIC BLOOD PRESSURE: 110 MMHG

## 2024-10-07 DIAGNOSIS — Z23 ENCOUNTER FOR IMMUNIZATION: ICD-10-CM

## 2024-10-07 PROCEDURE — 76821 MIDDLE CEREBRAL ARTERY ECHO: CPT

## 2024-10-07 PROCEDURE — 90686 IIV4 VACC NO PRSV 0.5 ML IM: CPT

## 2024-10-07 PROCEDURE — 76818 FETAL BIOPHYS PROFILE W/NST: CPT

## 2024-10-07 PROCEDURE — 76820 UMBILICAL ARTERY ECHO: CPT

## 2024-10-07 PROCEDURE — 99212 OFFICE O/P EST SF 10 MIN: CPT | Mod: 25

## 2024-10-07 PROCEDURE — 90471 IMMUNIZATION ADMIN: CPT

## 2024-10-08 ENCOUNTER — NON-APPOINTMENT (OUTPATIENT)
Age: 39
End: 2024-10-08

## 2024-10-08 ENCOUNTER — APPOINTMENT (OUTPATIENT)
Dept: OBGYN | Facility: CLINIC | Age: 39
End: 2024-10-08

## 2024-10-08 ENCOUNTER — APPOINTMENT (OUTPATIENT)
Dept: MATERNAL FETAL MEDICINE | Facility: CLINIC | Age: 39
End: 2024-10-08
Payer: COMMERCIAL

## 2024-10-08 ENCOUNTER — ASOB RESULT (OUTPATIENT)
Age: 39
End: 2024-10-08

## 2024-10-08 PROCEDURE — 99202 OFFICE O/P NEW SF 15 MIN: CPT | Mod: 95

## 2024-10-09 ENCOUNTER — TRANSCRIPTION ENCOUNTER (OUTPATIENT)
Age: 39
End: 2024-10-09

## 2024-10-09 ENCOUNTER — APPOINTMENT (OUTPATIENT)
Dept: MRI IMAGING | Facility: HOSPITAL | Age: 39
End: 2024-10-09

## 2024-10-09 ENCOUNTER — OUTPATIENT (OUTPATIENT)
Dept: OUTPATIENT SERVICES | Age: 39
LOS: 1 days | End: 2024-10-09

## 2024-10-09 DIAGNOSIS — Z90.49 ACQUIRED ABSENCE OF OTHER SPECIFIED PARTS OF DIGESTIVE TRACT: Chronic | ICD-10-CM

## 2024-10-09 DIAGNOSIS — O28.3 ABNORMAL ULTRASONIC FINDING ON ANTENATAL SCREENING OF MOTHER: ICD-10-CM

## 2024-10-09 PROCEDURE — 74712 MRI FETAL SNGL/1ST GESTATION: CPT | Mod: 26

## 2024-10-10 ENCOUNTER — APPOINTMENT (OUTPATIENT)
Dept: MATERNAL FETAL MEDICINE | Facility: CLINIC | Age: 39
End: 2024-10-10

## 2024-10-10 ENCOUNTER — NON-APPOINTMENT (OUTPATIENT)
Age: 39
End: 2024-10-10

## 2024-10-11 ENCOUNTER — NON-APPOINTMENT (OUTPATIENT)
Age: 39
End: 2024-10-11

## 2024-10-11 ENCOUNTER — APPOINTMENT (OUTPATIENT)
Dept: PEDIATRIC CARDIOLOGY | Facility: CLINIC | Age: 39
End: 2024-10-11

## 2024-10-11 PROBLEM — O35.9XX0 SUSPECTED FETAL ABNORMALITY AFFECTING MANAGEMENT OF MOTHER, SINGLE OR UNSPECIFIED FETUS: Status: ACTIVE | Noted: 2024-10-11

## 2024-10-11 PROBLEM — Z3A.34 34 WEEKS GESTATION OF PREGNANCY: Status: ACTIVE | Noted: 2024-10-11

## 2024-10-11 PROCEDURE — 76825 ECHO EXAM OF FETAL HEART: CPT

## 2024-10-11 PROCEDURE — 93325 DOPPLER ECHO COLOR FLOW MAPG: CPT | Mod: 59

## 2024-10-11 PROCEDURE — 76820 UMBILICAL ARTERY ECHO: CPT

## 2024-10-11 PROCEDURE — 99203 OFFICE O/P NEW LOW 30 MIN: CPT | Mod: 25

## 2024-10-11 PROCEDURE — 76827 ECHO EXAM OF FETAL HEART: CPT

## 2024-10-11 PROCEDURE — 76821 MIDDLE CEREBRAL ARTERY ECHO: CPT

## 2024-10-14 ENCOUNTER — APPOINTMENT (OUTPATIENT)
Dept: OBGYN | Facility: CLINIC | Age: 39
End: 2024-10-14
Payer: COMMERCIAL

## 2024-10-14 VITALS
DIASTOLIC BLOOD PRESSURE: 67 MMHG | HEIGHT: 66 IN | WEIGHT: 191 LBS | BODY MASS INDEX: 30.7 KG/M2 | HEART RATE: 71 BPM | SYSTOLIC BLOOD PRESSURE: 101 MMHG

## 2024-10-14 LAB
BILIRUB UR QL STRIP: NORMAL
GLUCOSE UR-MCNC: NORMAL
HCG UR QL: NORMAL EU/DL
HGB UR QL STRIP.AUTO: NORMAL
KETONES UR-MCNC: NORMAL
LEUKOCYTE ESTERASE UR QL STRIP: NORMAL
NITRITE UR QL STRIP: NORMAL
PH UR STRIP: 7
PROT UR STRIP-MCNC: NORMAL
SP GR UR STRIP: 1.01

## 2024-10-14 PROCEDURE — 99213 OFFICE O/P EST LOW 20 MIN: CPT

## 2024-10-15 ENCOUNTER — APPOINTMENT (OUTPATIENT)
Dept: ANTEPARTUM | Facility: CLINIC | Age: 39
End: 2024-10-15
Payer: COMMERCIAL

## 2024-10-15 ENCOUNTER — APPOINTMENT (OUTPATIENT)
Dept: OBGYN | Facility: CLINIC | Age: 39
End: 2024-10-15

## 2024-10-15 ENCOUNTER — ASOB RESULT (OUTPATIENT)
Age: 39
End: 2024-10-15

## 2024-10-15 ENCOUNTER — APPOINTMENT (OUTPATIENT)
Dept: PEDIATRIC SURGERY | Facility: CLINIC | Age: 39
End: 2024-10-15
Payer: COMMERCIAL

## 2024-10-15 ENCOUNTER — APPOINTMENT (OUTPATIENT)
Dept: OBGYN | Facility: CLINIC | Age: 39
End: 2024-10-15
Payer: COMMERCIAL

## 2024-10-15 ENCOUNTER — APPOINTMENT (OUTPATIENT)
Dept: MATERNAL FETAL MEDICINE | Facility: CLINIC | Age: 39
End: 2024-10-15
Payer: COMMERCIAL

## 2024-10-15 VITALS
HEART RATE: 72 BPM | SYSTOLIC BLOOD PRESSURE: 100 MMHG | WEIGHT: 193 LBS | DIASTOLIC BLOOD PRESSURE: 62 MMHG | OXYGEN SATURATION: 98 % | BODY MASS INDEX: 31.15 KG/M2

## 2024-10-15 DIAGNOSIS — O28.3 ABNORMAL ULTRASONIC FINDING ON ANTENATAL SCREENING OF MOTHER: ICD-10-CM

## 2024-10-15 DIAGNOSIS — J45.909 UNSPECIFIED ASTHMA, UNCOMPLICATED: ICD-10-CM

## 2024-10-15 DIAGNOSIS — Z34.90 ENCOUNTER FOR SUPERVISION OF NORMAL PREGNANCY, UNSPECIFIED, UNSPECIFIED TRIMESTER: ICD-10-CM

## 2024-10-15 LAB
BILIRUB UR QL STRIP: NEGATIVE
CLARITY UR: CLEAR
COLLECTION METHOD: NORMAL
GLUCOSE UR-MCNC: NEGATIVE
HCG UR QL: 0 EU/DL
HGB UR QL STRIP.AUTO: NEGATIVE
KETONES UR-MCNC: NEGATIVE
LEUKOCYTE ESTERASE UR QL STRIP: NORMAL
NITRITE UR QL STRIP: NEGATIVE
PH UR STRIP: 6
PROT UR STRIP-MCNC: NEGATIVE
SP GR UR STRIP: 1

## 2024-10-15 PROCEDURE — 76820 UMBILICAL ARTERY ECHO: CPT

## 2024-10-15 PROCEDURE — 99205 OFFICE O/P NEW HI 60 MIN: CPT | Mod: 95

## 2024-10-15 PROCEDURE — 76821 MIDDLE CEREBRAL ARTERY ECHO: CPT

## 2024-10-15 PROCEDURE — 99214 OFFICE O/P EST MOD 30 MIN: CPT

## 2024-10-15 PROCEDURE — 99212 OFFICE O/P EST SF 10 MIN: CPT

## 2024-10-15 PROCEDURE — 76818 FETAL BIOPHYS PROFILE W/NST: CPT

## 2024-10-15 PROCEDURE — ZZZZZ: CPT

## 2024-10-15 RX ORDER — CHOLECALCIFEROL (VITAMIN D3) 25 MCG
27-0.8-228 TABLET,CHEWABLE ORAL
Refills: 0 | Status: ACTIVE | COMMUNITY
Start: 2024-10-15

## 2024-10-15 RX ORDER — ALBUTEROL SULFATE 90 UG/1
108 (90 BASE) INHALANT RESPIRATORY (INHALATION)
Qty: 1 | Refills: 5 | Status: ACTIVE | COMMUNITY
Start: 2024-10-15

## 2024-10-16 ENCOUNTER — APPOINTMENT (OUTPATIENT)
Dept: ANTEPARTUM | Facility: CLINIC | Age: 39
End: 2024-10-16

## 2024-10-16 ENCOUNTER — NON-APPOINTMENT (OUTPATIENT)
Age: 39
End: 2024-10-16

## 2024-10-16 LAB — GLUCOSE 1H P 100 G GLC PO SERPL-MCNC: 91 MG/DL

## 2024-10-18 ENCOUNTER — APPOINTMENT (OUTPATIENT)
Dept: OTHER | Facility: CLINIC | Age: 39
End: 2024-10-18
Payer: COMMERCIAL

## 2024-10-18 DIAGNOSIS — Z3A.34 34 WEEKS GESTATION OF PREGNANCY: ICD-10-CM

## 2024-10-18 DIAGNOSIS — O35.9XX0 MATERNAL CARE FOR (SUSPECTED) FETAL ABNORMALITY AND DAMAGE, UNSPECIFIED, NOT APPLICABLE OR UNSPECIFIED: ICD-10-CM

## 2024-10-18 DIAGNOSIS — O40.3XX0 POLYHYDRAMNIOS, THIRD TRIMESTER, NOT APPLICABLE OR UNSPECIFIED: ICD-10-CM

## 2024-10-18 PROCEDURE — 99204 OFFICE O/P NEW MOD 45 MIN: CPT | Mod: 95

## 2024-10-21 ENCOUNTER — APPOINTMENT (OUTPATIENT)
Dept: OBGYN | Facility: CLINIC | Age: 39
End: 2024-10-21

## 2024-10-21 ENCOUNTER — APPOINTMENT (OUTPATIENT)
Dept: ANTEPARTUM | Facility: CLINIC | Age: 39
End: 2024-10-21

## 2024-10-22 ENCOUNTER — ASOB RESULT (OUTPATIENT)
Age: 39
End: 2024-10-22

## 2024-10-22 ENCOUNTER — APPOINTMENT (OUTPATIENT)
Dept: ANTEPARTUM | Facility: CLINIC | Age: 39
End: 2024-10-22
Payer: COMMERCIAL

## 2024-10-22 PROCEDURE — 76819 FETAL BIOPHYS PROFIL W/O NST: CPT | Mod: 59

## 2024-10-22 PROCEDURE — 76816 OB US FOLLOW-UP PER FETUS: CPT

## 2024-10-22 PROCEDURE — 99212 OFFICE O/P EST SF 10 MIN: CPT | Mod: 25

## 2024-10-24 ENCOUNTER — NON-APPOINTMENT (OUTPATIENT)
Age: 39
End: 2024-10-24

## 2024-10-25 ENCOUNTER — APPOINTMENT (OUTPATIENT)
Dept: ANTEPARTUM | Facility: CLINIC | Age: 39
End: 2024-10-25
Payer: COMMERCIAL

## 2024-10-25 ENCOUNTER — NON-APPOINTMENT (OUTPATIENT)
Age: 39
End: 2024-10-25

## 2024-10-25 ENCOUNTER — ASOB RESULT (OUTPATIENT)
Age: 39
End: 2024-10-25

## 2024-10-25 PROCEDURE — 76819 FETAL BIOPHYS PROFIL W/O NST: CPT

## 2024-10-26 ENCOUNTER — APPOINTMENT (OUTPATIENT)
Dept: ANTEPARTUM | Facility: CLINIC | Age: 39
End: 2024-10-26

## 2024-10-26 ENCOUNTER — OUTPATIENT (OUTPATIENT)
Dept: INPATIENT UNIT | Facility: HOSPITAL | Age: 39
LOS: 1 days | Discharge: HOME | End: 2024-10-26

## 2024-10-26 VITALS
DIASTOLIC BLOOD PRESSURE: 66 MMHG | HEART RATE: 86 BPM | SYSTOLIC BLOOD PRESSURE: 106 MMHG | TEMPERATURE: 98 F | RESPIRATION RATE: 16 BRPM

## 2024-10-26 DIAGNOSIS — Z90.49 ACQUIRED ABSENCE OF OTHER SPECIFIED PARTS OF DIGESTIVE TRACT: Chronic | ICD-10-CM

## 2024-10-26 DIAGNOSIS — O26.899 OTHER SPECIFIED PREGNANCY RELATED CONDITIONS, UNSPECIFIED TRIMESTER: ICD-10-CM

## 2024-10-26 DIAGNOSIS — Z98.890 OTHER SPECIFIED POSTPROCEDURAL STATES: Chronic | ICD-10-CM

## 2024-10-27 VITALS — HEART RATE: 68 BPM | DIASTOLIC BLOOD PRESSURE: 63 MMHG | SYSTOLIC BLOOD PRESSURE: 111 MMHG

## 2024-10-28 ENCOUNTER — APPOINTMENT (OUTPATIENT)
Dept: OBGYN | Facility: CLINIC | Age: 39
End: 2024-10-28

## 2024-10-28 ENCOUNTER — NON-APPOINTMENT (OUTPATIENT)
Age: 39
End: 2024-10-28

## 2024-10-28 ENCOUNTER — APPOINTMENT (OUTPATIENT)
Dept: ANTEPARTUM | Facility: CLINIC | Age: 39
End: 2024-10-28

## 2024-10-28 ENCOUNTER — APPOINTMENT (OUTPATIENT)
Dept: OBGYN | Facility: CLINIC | Age: 39
End: 2024-10-28
Payer: COMMERCIAL

## 2024-10-28 VITALS
BODY MASS INDEX: 31.98 KG/M2 | DIASTOLIC BLOOD PRESSURE: 72 MMHG | HEART RATE: 89 BPM | SYSTOLIC BLOOD PRESSURE: 107 MMHG | HEIGHT: 66 IN | WEIGHT: 199 LBS

## 2024-10-28 DIAGNOSIS — O35.9XX0 MATERNAL CARE FOR (SUSPECTED) FETAL ABNORMALITY AND DAMAGE, UNSPECIFIED, NOT APPLICABLE OR UNSPECIFIED: ICD-10-CM

## 2024-10-28 DIAGNOSIS — O40.3XX0 POLYHYDRAMNIOS, THIRD TRIMESTER, NOT APPLICABLE OR UNSPECIFIED: ICD-10-CM

## 2024-10-28 PROCEDURE — 99213 OFFICE O/P EST LOW 20 MIN: CPT | Mod: 25

## 2024-10-29 ENCOUNTER — ASOB RESULT (OUTPATIENT)
Age: 39
End: 2024-10-29

## 2024-10-29 ENCOUNTER — APPOINTMENT (OUTPATIENT)
Dept: ANTEPARTUM | Facility: CLINIC | Age: 39
End: 2024-10-29
Payer: COMMERCIAL

## 2024-10-29 DIAGNOSIS — O36.8130 DECREASED FETAL MOVEMENTS, THIRD TRIMESTER, NOT APPLICABLE OR UNSPECIFIED: ICD-10-CM

## 2024-10-29 DIAGNOSIS — O35.BXX0 MATERNAL CARE FOR OTHER (SUSPECTED) FETAL ABNORMALITY AND DAMAGE, FETAL CARDIAC ANOMALIES, NOT APPLICABLE OR UNSPECIFIED: ICD-10-CM

## 2024-10-29 DIAGNOSIS — Z3A.35 35 WEEKS GESTATION OF PREGNANCY: ICD-10-CM

## 2024-10-29 DIAGNOSIS — O09.523 SUPERVISION OF ELDERLY MULTIGRAVIDA, THIRD TRIMESTER: ICD-10-CM

## 2024-10-29 PROCEDURE — 76818 FETAL BIOPHYS PROFILE W/NST: CPT

## 2024-10-30 LAB
GP B STREP DNA SPEC QL NAA+PROBE: NOT DETECTED
SOURCE GBS: NORMAL

## 2024-10-31 ENCOUNTER — NON-APPOINTMENT (OUTPATIENT)
Age: 39
End: 2024-10-31

## 2024-11-01 ENCOUNTER — APPOINTMENT (OUTPATIENT)
Dept: ANTEPARTUM | Facility: CLINIC | Age: 39
End: 2024-11-01
Payer: COMMERCIAL

## 2024-11-01 ENCOUNTER — APPOINTMENT (OUTPATIENT)
Dept: ANTEPARTUM | Facility: CLINIC | Age: 39
End: 2024-11-01

## 2024-11-01 ENCOUNTER — ASOB RESULT (OUTPATIENT)
Age: 39
End: 2024-11-01

## 2024-11-01 PROCEDURE — 76818 FETAL BIOPHYS PROFILE W/NST: CPT

## 2024-11-03 ENCOUNTER — INPATIENT (INPATIENT)
Facility: HOSPITAL | Age: 39
LOS: 3 days | Discharge: HOME | End: 2024-11-07
Attending: STUDENT IN AN ORGANIZED HEALTH CARE EDUCATION/TRAINING PROGRAM | Admitting: STUDENT IN AN ORGANIZED HEALTH CARE EDUCATION/TRAINING PROGRAM
Payer: COMMERCIAL

## 2024-11-03 ENCOUNTER — TRANSCRIPTION ENCOUNTER (OUTPATIENT)
Age: 39
End: 2024-11-03

## 2024-11-03 VITALS
TEMPERATURE: 98 F | RESPIRATION RATE: 16 BRPM | DIASTOLIC BLOOD PRESSURE: 75 MMHG | HEART RATE: 73 BPM | SYSTOLIC BLOOD PRESSURE: 115 MMHG

## 2024-11-03 DIAGNOSIS — Z90.49 ACQUIRED ABSENCE OF OTHER SPECIFIED PARTS OF DIGESTIVE TRACT: Chronic | ICD-10-CM

## 2024-11-03 DIAGNOSIS — Z98.890 OTHER SPECIFIED POSTPROCEDURAL STATES: Chronic | ICD-10-CM

## 2024-11-03 DIAGNOSIS — O26.899 OTHER SPECIFIED PREGNANCY RELATED CONDITIONS, UNSPECIFIED TRIMESTER: ICD-10-CM

## 2024-11-03 DIAGNOSIS — O42.919 PRETERM PREMATURE RUPTURE OF MEMBRANES, UNSPECIFIED AS TO LENGTH OF TIME BETWEEN RUPTURE AND ONSET OF LABOR, UNSPECIFIED TRIMESTER: ICD-10-CM

## 2024-11-03 LAB
ALBUMIN SERPL ELPH-MCNC: 2.3 G/DL — LOW (ref 3.3–5)
ALBUMIN SERPL ELPH-MCNC: 2.6 G/DL — LOW (ref 3.3–5)
ALP SERPL-CCNC: 104 U/L — SIGNIFICANT CHANGE UP (ref 40–120)
ALP SERPL-CCNC: 114 U/L — SIGNIFICANT CHANGE UP (ref 40–120)
ALT FLD-CCNC: 44 U/L — HIGH (ref 4–33)
ALT FLD-CCNC: 51 U/L — HIGH (ref 4–33)
AMYLASE P1 CFR SERPL: 660 U/L — HIGH (ref 25–125)
ANION GAP SERPL CALC-SCNC: 11 MMOL/L — SIGNIFICANT CHANGE UP (ref 7–14)
ANION GAP SERPL CALC-SCNC: 9 MMOL/L — SIGNIFICANT CHANGE UP (ref 7–14)
APTT BLD: 26.4 SEC — SIGNIFICANT CHANGE UP (ref 24.5–35.6)
AST SERPL-CCNC: 34 U/L — HIGH (ref 4–32)
AST SERPL-CCNC: 39 U/L — HIGH (ref 4–32)
BASOPHILS # BLD AUTO: 0.06 K/UL — SIGNIFICANT CHANGE UP (ref 0–0.2)
BASOPHILS # BLD AUTO: 0.06 K/UL — SIGNIFICANT CHANGE UP (ref 0–0.2)
BASOPHILS # BLD AUTO: 0.08 K/UL — SIGNIFICANT CHANGE UP (ref 0–0.2)
BASOPHILS NFR BLD AUTO: 0.3 % — SIGNIFICANT CHANGE UP (ref 0–2)
BASOPHILS NFR BLD AUTO: 0.3 % — SIGNIFICANT CHANGE UP (ref 0–2)
BASOPHILS NFR BLD AUTO: 0.4 % — SIGNIFICANT CHANGE UP (ref 0–2)
BILIRUB SERPL-MCNC: 0.3 MG/DL — SIGNIFICANT CHANGE UP (ref 0.2–1.2)
BILIRUB SERPL-MCNC: 0.6 MG/DL — SIGNIFICANT CHANGE UP (ref 0.2–1.2)
BLD GP AB SCN SERPL QL: NEGATIVE — SIGNIFICANT CHANGE UP
BUN SERPL-MCNC: 7 MG/DL — SIGNIFICANT CHANGE UP (ref 7–23)
BUN SERPL-MCNC: 7 MG/DL — SIGNIFICANT CHANGE UP (ref 7–23)
CALCIUM SERPL-MCNC: 8.1 MG/DL — LOW (ref 8.4–10.5)
CALCIUM SERPL-MCNC: 8.4 MG/DL — SIGNIFICANT CHANGE UP (ref 8.4–10.5)
CHLORIDE SERPL-SCNC: 102 MMOL/L — SIGNIFICANT CHANGE UP (ref 98–107)
CHLORIDE SERPL-SCNC: 105 MMOL/L — SIGNIFICANT CHANGE UP (ref 98–107)
CO2 SERPL-SCNC: 21 MMOL/L — LOW (ref 22–31)
CO2 SERPL-SCNC: 21 MMOL/L — LOW (ref 22–31)
CREAT SERPL-MCNC: 0.8 MG/DL — SIGNIFICANT CHANGE UP (ref 0.5–1.3)
CREAT SERPL-MCNC: 0.86 MG/DL — SIGNIFICANT CHANGE UP (ref 0.5–1.3)
EGFR: 89 ML/MIN/1.73M2 — SIGNIFICANT CHANGE UP
EGFR: 97 ML/MIN/1.73M2 — SIGNIFICANT CHANGE UP
EOSINOPHIL # BLD AUTO: 0.02 K/UL — SIGNIFICANT CHANGE UP (ref 0–0.5)
EOSINOPHIL # BLD AUTO: 0.14 K/UL — SIGNIFICANT CHANGE UP (ref 0–0.5)
EOSINOPHIL # BLD AUTO: 0.27 K/UL — SIGNIFICANT CHANGE UP (ref 0–0.5)
EOSINOPHIL NFR BLD AUTO: 0.1 % — SIGNIFICANT CHANGE UP (ref 0–6)
EOSINOPHIL NFR BLD AUTO: 0.7 % — SIGNIFICANT CHANGE UP (ref 0–6)
EOSINOPHIL NFR BLD AUTO: 1.6 % — SIGNIFICANT CHANGE UP (ref 0–6)
FIBRINOGEN PPP-MCNC: 416 MG/DL — SIGNIFICANT CHANGE UP (ref 200–465)
GLUCOSE BLDC GLUCOMTR-MCNC: 99 MG/DL — SIGNIFICANT CHANGE UP (ref 70–99)
GLUCOSE SERPL-MCNC: 121 MG/DL — HIGH (ref 70–99)
GLUCOSE SERPL-MCNC: 98 MG/DL — SIGNIFICANT CHANGE UP (ref 70–99)
HCT VFR BLD CALC: 26.7 % — LOW (ref 34.5–45)
HCT VFR BLD CALC: 27.6 % — LOW (ref 34.5–45)
HCT VFR BLD CALC: 29.3 % — LOW (ref 34.5–45)
HCT VFR BLD CALC: 34.3 % — LOW (ref 34.5–45)
HGB BLD-MCNC: 10 G/DL — LOW (ref 11.5–15.5)
HGB BLD-MCNC: 11.8 G/DL — SIGNIFICANT CHANGE UP (ref 11.5–15.5)
HGB BLD-MCNC: 8.9 G/DL — LOW (ref 11.5–15.5)
HGB BLD-MCNC: 9.3 G/DL — LOW (ref 11.5–15.5)
IANC: 12.06 K/UL — HIGH (ref 1.8–7.4)
IANC: 17.7 K/UL — HIGH (ref 1.8–7.4)
IANC: 25.38 K/UL — HIGH (ref 1.8–7.4)
IMM GRANULOCYTES NFR BLD AUTO: 1.7 % — HIGH (ref 0–0.9)
IMM GRANULOCYTES NFR BLD AUTO: 1.8 % — HIGH (ref 0–0.9)
IMM GRANULOCYTES NFR BLD AUTO: 1.9 % — HIGH (ref 0–0.9)
INR BLD: 0.9 RATIO — SIGNIFICANT CHANGE UP (ref 0.85–1.16)
LIDOCAIN IGE QN: 28 U/L — SIGNIFICANT CHANGE UP (ref 7–60)
LYMPHOCYTES # BLD AUTO: 1.95 K/UL — SIGNIFICANT CHANGE UP (ref 1–3.3)
LYMPHOCYTES # BLD AUTO: 10.2 % — LOW (ref 13–44)
LYMPHOCYTES # BLD AUTO: 15.4 % — SIGNIFICANT CHANGE UP (ref 13–44)
LYMPHOCYTES # BLD AUTO: 2.18 K/UL — SIGNIFICANT CHANGE UP (ref 1–3.3)
LYMPHOCYTES # BLD AUTO: 2.52 K/UL — SIGNIFICANT CHANGE UP (ref 1–3.3)
LYMPHOCYTES # BLD AUTO: 6.5 % — LOW (ref 13–44)
MCHC RBC-ENTMCNC: 28.6 PG — SIGNIFICANT CHANGE UP (ref 27–34)
MCHC RBC-ENTMCNC: 29.1 PG — SIGNIFICANT CHANGE UP (ref 27–34)
MCHC RBC-ENTMCNC: 29.2 PG — SIGNIFICANT CHANGE UP (ref 27–34)
MCHC RBC-ENTMCNC: 29.3 PG — SIGNIFICANT CHANGE UP (ref 27–34)
MCHC RBC-ENTMCNC: 33.3 G/DL — SIGNIFICANT CHANGE UP (ref 32–36)
MCHC RBC-ENTMCNC: 33.7 G/DL — SIGNIFICANT CHANGE UP (ref 32–36)
MCHC RBC-ENTMCNC: 34.1 G/DL — SIGNIFICANT CHANGE UP (ref 32–36)
MCHC RBC-ENTMCNC: 34.4 G/DL — SIGNIFICANT CHANGE UP (ref 32–36)
MCV RBC AUTO: 85.1 FL — SIGNIFICANT CHANGE UP (ref 80–100)
MCV RBC AUTO: 85.2 FL — SIGNIFICANT CHANGE UP (ref 80–100)
MCV RBC AUTO: 85.9 FL — SIGNIFICANT CHANGE UP (ref 80–100)
MCV RBC AUTO: 86.5 FL — SIGNIFICANT CHANGE UP (ref 80–100)
MONOCYTES # BLD AUTO: 0.91 K/UL — HIGH (ref 0–0.9)
MONOCYTES # BLD AUTO: 1.19 K/UL — HIGH (ref 0–0.9)
MONOCYTES # BLD AUTO: 1.94 K/UL — HIGH (ref 0–0.9)
MONOCYTES NFR BLD AUTO: 4.3 % — SIGNIFICANT CHANGE UP (ref 2–14)
MONOCYTES NFR BLD AUTO: 6.5 % — SIGNIFICANT CHANGE UP (ref 2–14)
MONOCYTES NFR BLD AUTO: 7.3 % — SIGNIFICANT CHANGE UP (ref 2–14)
NEUTROPHILS # BLD AUTO: 12.06 K/UL — HIGH (ref 1.8–7.4)
NEUTROPHILS # BLD AUTO: 17.7 K/UL — HIGH (ref 1.8–7.4)
NEUTROPHILS # BLD AUTO: 25.38 K/UL — HIGH (ref 1.8–7.4)
NEUTROPHILS NFR BLD AUTO: 73.5 % — SIGNIFICANT CHANGE UP (ref 43–77)
NEUTROPHILS NFR BLD AUTO: 82.8 % — HIGH (ref 43–77)
NEUTROPHILS NFR BLD AUTO: 84.7 % — HIGH (ref 43–77)
NRBC # BLD: 0 /100 WBCS — SIGNIFICANT CHANGE UP (ref 0–0)
NRBC # FLD: 0 K/UL — SIGNIFICANT CHANGE UP (ref 0–0)
PLATELET # BLD AUTO: 232 K/UL — SIGNIFICANT CHANGE UP (ref 150–400)
PLATELET # BLD AUTO: 245 K/UL — SIGNIFICANT CHANGE UP (ref 150–400)
PLATELET # BLD AUTO: 248 K/UL — SIGNIFICANT CHANGE UP (ref 150–400)
PLATELET # BLD AUTO: 252 K/UL — SIGNIFICANT CHANGE UP (ref 150–400)
POTASSIUM SERPL-MCNC: 4 MMOL/L — SIGNIFICANT CHANGE UP (ref 3.5–5.3)
POTASSIUM SERPL-MCNC: 5.2 MMOL/L — SIGNIFICANT CHANGE UP (ref 3.5–5.3)
POTASSIUM SERPL-SCNC: 4 MMOL/L — SIGNIFICANT CHANGE UP (ref 3.5–5.3)
POTASSIUM SERPL-SCNC: 5.2 MMOL/L — SIGNIFICANT CHANGE UP (ref 3.5–5.3)
PROT SERPL-MCNC: 4.4 G/DL — LOW (ref 6–8.3)
PROT SERPL-MCNC: 4.8 G/DL — LOW (ref 6–8.3)
PROTHROM AB SERPL-ACNC: 10.7 SEC — SIGNIFICANT CHANGE UP (ref 9.9–13.4)
RBC # BLD: 3.11 M/UL — LOW (ref 3.8–5.2)
RBC # BLD: 3.19 M/UL — LOW (ref 3.8–5.2)
RBC # BLD: 3.44 M/UL — LOW (ref 3.8–5.2)
RBC # BLD: 4.03 M/UL — SIGNIFICANT CHANGE UP (ref 3.8–5.2)
RBC # FLD: 12.5 % — SIGNIFICANT CHANGE UP (ref 10.3–14.5)
RBC # FLD: 12.6 % — SIGNIFICANT CHANGE UP (ref 10.3–14.5)
RBC # FLD: 12.8 % — SIGNIFICANT CHANGE UP (ref 10.3–14.5)
RBC # FLD: 13.2 % — SIGNIFICANT CHANGE UP (ref 10.3–14.5)
RH IG SCN BLD-IMP: POSITIVE — SIGNIFICANT CHANGE UP
RH IG SCN BLD-IMP: POSITIVE — SIGNIFICANT CHANGE UP
SODIUM SERPL-SCNC: 134 MMOL/L — LOW (ref 135–145)
SODIUM SERPL-SCNC: 135 MMOL/L — SIGNIFICANT CHANGE UP (ref 135–145)
T PALLIDUM AB TITR SER: NEGATIVE — SIGNIFICANT CHANGE UP
WBC # BLD: 16.39 K/UL — HIGH (ref 3.8–10.5)
WBC # BLD: 21.35 K/UL — HIGH (ref 3.8–10.5)
WBC # BLD: 22.34 K/UL — HIGH (ref 3.8–10.5)
WBC # BLD: 29.93 K/UL — HIGH (ref 3.8–10.5)
WBC # FLD AUTO: 16.39 K/UL — HIGH (ref 3.8–10.5)
WBC # FLD AUTO: 21.35 K/UL — HIGH (ref 3.8–10.5)
WBC # FLD AUTO: 22.34 K/UL — HIGH (ref 3.8–10.5)
WBC # FLD AUTO: 29.93 K/UL — HIGH (ref 3.8–10.5)

## 2024-11-03 PROCEDURE — 59514 CESAREAN DELIVERY ONLY: CPT | Mod: U9,GC

## 2024-11-03 DEVICE — SURGICEL SNOW 2 X 4": Type: IMPLANTABLE DEVICE | Status: FUNCTIONAL

## 2024-11-03 RX ORDER — ALBUTEROL 90 MCG
2 AEROSOL (GRAM) INHALATION EVERY 6 HOURS
Refills: 0 | Status: DISCONTINUED | OUTPATIENT
Start: 2024-11-03 | End: 2024-11-07

## 2024-11-03 RX ORDER — MAGNESIUM HYDROXIDE 1200 MG/15ML
30 SUSPENSION ORAL
Refills: 0 | Status: DISCONTINUED | OUTPATIENT
Start: 2024-11-03 | End: 2024-11-07

## 2024-11-03 RX ORDER — NALOXONE HYDROCHLORIDE 1 MG/ML
0.1 INJECTION, SOLUTION INTRAMUSCULAR; INTRAVENOUS; SUBCUTANEOUS
Refills: 0 | Status: DISCONTINUED | OUTPATIENT
Start: 2024-11-04 | End: 2024-11-05

## 2024-11-03 RX ORDER — OXYCODONE HYDROCHLORIDE 30 MG/1
5 TABLET ORAL
Refills: 0 | Status: COMPLETED | OUTPATIENT
Start: 2024-11-03 | End: 2024-11-10

## 2024-11-03 RX ORDER — ACETAMINOPHEN 500 MG
1000 TABLET ORAL ONCE
Refills: 0 | Status: COMPLETED | OUTPATIENT
Start: 2024-11-03 | End: 2024-11-03

## 2024-11-03 RX ORDER — HEPARIN SODIUM 10000 [USP'U]/ML
5000 INJECTION INTRAVENOUS; SUBCUTANEOUS EVERY 12 HOURS
Refills: 0 | Status: DISCONTINUED | OUTPATIENT
Start: 2024-11-03 | End: 2024-11-07

## 2024-11-03 RX ORDER — CLOSTRIDIUM TETANI TOXOID ANTIGEN (FORMALDEHYDE INACTIVATED), CORYNEBACTERIUM DIPHTHERIAE TOXOID ANTIGEN (FORMALDEHYDE INACTIVATED), BORDETELLA PERTUSSIS TOXOID ANTIGEN (GLUTARALDEHYDE INACTIVATED), BORDETELLA PERTUSSIS FILAMENTOUS HEMAGGLUTININ ANTIGEN (FORMALDEHYDE INACTIVATED), BORDETELLA PERTUSSIS PERTACTIN ANTIGEN, AND BORDETELLA PERTUSSIS FIMBRIAE 2/3 ANTIGEN 5; 2; 2.5; 5; 3; 5 [LF]/.5ML; [LF]/.5ML; UG/.5ML; UG/.5ML; UG/.5ML; UG/.5ML
0.5 INJECTION, SUSPENSION INTRAMUSCULAR ONCE
Refills: 0 | Status: DISCONTINUED | OUTPATIENT
Start: 2024-11-03 | End: 2024-11-07

## 2024-11-03 RX ORDER — MORPHINE SULFATE 30 MG/1
0.15 TABLET, EXTENDED RELEASE ORAL ONCE
Refills: 0 | Status: DISCONTINUED | OUTPATIENT
Start: 2024-11-04 | End: 2024-11-05

## 2024-11-03 RX ORDER — ONDANSETRON HYDROCHLORIDE 2 MG/ML
4 INJECTION, SOLUTION INTRAMUSCULAR; INTRAVENOUS ONCE
Refills: 0 | Status: COMPLETED | OUTPATIENT
Start: 2024-11-03 | End: 2024-11-03

## 2024-11-03 RX ORDER — CITRIC ACID/SODIUM CITRATE 334-500MG
30 SOLUTION, ORAL ORAL ONCE
Refills: 0 | Status: COMPLETED | OUTPATIENT
Start: 2024-11-03 | End: 2024-11-03

## 2024-11-03 RX ORDER — OXYTOCIN IN D5W-0.2% SODIUM CL 15/250 ML
42 PLASTIC BAG, INJECTION (ML) INTRAVENOUS
Qty: 30 | Refills: 0 | Status: DISCONTINUED | OUTPATIENT
Start: 2024-11-03 | End: 2024-11-05

## 2024-11-03 RX ORDER — MODIFIED LANOLIN
1 OINTMENT (GRAM) TOPICAL EVERY 6 HOURS
Refills: 0 | Status: DISCONTINUED | OUTPATIENT
Start: 2024-11-03 | End: 2024-11-07

## 2024-11-03 RX ORDER — NALBUPHINE HCL 100 %
2.5 POWDER (GRAM) MISCELLANEOUS EVERY 6 HOURS
Refills: 0 | Status: DISCONTINUED | OUTPATIENT
Start: 2024-11-04 | End: 2024-11-05

## 2024-11-03 RX ORDER — OXYCODONE HYDROCHLORIDE 30 MG/1
5 TABLET ORAL
Refills: 0 | Status: DISCONTINUED | OUTPATIENT
Start: 2024-11-04 | End: 2024-11-04

## 2024-11-03 RX ORDER — METOCLOPRAMIDE HCL 10 MG
10 TABLET ORAL ONCE
Refills: 0 | Status: COMPLETED | OUTPATIENT
Start: 2024-11-03 | End: 2024-11-03

## 2024-11-03 RX ORDER — DEXAMETHASONE 1.5 MG 1.5 MG/1
4 TABLET ORAL EVERY 6 HOURS
Refills: 0 | Status: DISCONTINUED | OUTPATIENT
Start: 2024-11-04 | End: 2024-11-05

## 2024-11-03 RX ORDER — PRENATAL VIT/IRON FUM/FOLIC AC 60 MG-1 MG
1 TABLET ORAL
Refills: 0 | DISCHARGE

## 2024-11-03 RX ORDER — SIMETHICONE 80 MG/1
80 TABLET, CHEWABLE ORAL EVERY 4 HOURS
Refills: 0 | Status: DISCONTINUED | OUTPATIENT
Start: 2024-11-03 | End: 2024-11-07

## 2024-11-03 RX ORDER — ONDANSETRON HYDROCHLORIDE 2 MG/ML
4 INJECTION, SOLUTION INTRAMUSCULAR; INTRAVENOUS EVERY 6 HOURS
Refills: 0 | Status: DISCONTINUED | OUTPATIENT
Start: 2024-11-04 | End: 2024-11-05

## 2024-11-03 RX ORDER — DIPHENHYDRAMINE HCL 12.5MG/5ML
25 ELIXIR ORAL EVERY 6 HOURS
Refills: 0 | Status: DISCONTINUED | OUTPATIENT
Start: 2024-11-03 | End: 2024-11-07

## 2024-11-03 RX ORDER — KETOROLAC TROMETHAMINE 30 MG/ML
30 INJECTION INTRAMUSCULAR; INTRAVENOUS EVERY 6 HOURS
Refills: 0 | Status: DISCONTINUED | OUTPATIENT
Start: 2024-11-03 | End: 2024-11-04

## 2024-11-03 RX ORDER — OXYCODONE HYDROCHLORIDE 30 MG/1
10 TABLET ORAL
Refills: 0 | Status: DISCONTINUED | OUTPATIENT
Start: 2024-11-04 | End: 2024-11-04

## 2024-11-03 RX ORDER — SIMETHICONE 80 MG/1
80 TABLET, CHEWABLE ORAL EVERY 6 HOURS
Refills: 0 | Status: DISCONTINUED | OUTPATIENT
Start: 2024-11-03 | End: 2024-11-07

## 2024-11-03 RX ORDER — FAMOTIDINE 10 MG/ML
20 INJECTION INTRAVENOUS ONCE
Refills: 0 | Status: COMPLETED | OUTPATIENT
Start: 2024-11-03 | End: 2024-11-03

## 2024-11-03 RX ORDER — CHLORHEXIDINE GLUCONATE 40 MG/ML
1 SOLUTION TOPICAL DAILY
Refills: 0 | Status: DISCONTINUED | OUTPATIENT
Start: 2024-11-03 | End: 2024-11-03

## 2024-11-03 RX ADMIN — KETOROLAC TROMETHAMINE 30 MILLIGRAM(S): 30 INJECTION INTRAMUSCULAR; INTRAVENOUS at 23:36

## 2024-11-03 RX ADMIN — Medication 400 MILLIGRAM(S): at 10:00

## 2024-11-03 RX ADMIN — Medication 10 MILLIGRAM(S): at 19:40

## 2024-11-03 RX ADMIN — HEPARIN SODIUM 5000 UNIT(S): 10000 INJECTION INTRAVENOUS; SUBCUTANEOUS at 19:40

## 2024-11-03 RX ADMIN — Medication 500 MILLILITER(S): at 09:20

## 2024-11-03 RX ADMIN — Medication 2 PUFF(S): at 22:40

## 2024-11-03 RX ADMIN — ONDANSETRON HYDROCHLORIDE 4 MILLIGRAM(S): 2 INJECTION, SOLUTION INTRAMUSCULAR; INTRAVENOUS at 10:15

## 2024-11-03 RX ADMIN — ONDANSETRON HYDROCHLORIDE 4 MILLIGRAM(S): 2 INJECTION, SOLUTION INTRAMUSCULAR; INTRAVENOUS at 18:55

## 2024-11-03 RX ADMIN — KETOROLAC TROMETHAMINE 30 MILLIGRAM(S): 30 INJECTION INTRAMUSCULAR; INTRAVENOUS at 13:58

## 2024-11-03 RX ADMIN — SIMETHICONE 80 MILLIGRAM(S): 80 TABLET, CHEWABLE ORAL at 23:35

## 2024-11-03 RX ADMIN — Medication 30 MILLILITER(S): at 07:34

## 2024-11-03 RX ADMIN — Medication 1000 MILLILITER(S): at 11:30

## 2024-11-03 RX ADMIN — Medication 1000 MILLIGRAM(S): at 10:45

## 2024-11-03 RX ADMIN — KETOROLAC TROMETHAMINE 30 MILLIGRAM(S): 30 INJECTION INTRAMUSCULAR; INTRAVENOUS at 17:16

## 2024-11-03 RX ADMIN — SIMETHICONE 80 MILLIGRAM(S): 80 TABLET, CHEWABLE ORAL at 17:14

## 2024-11-03 RX ADMIN — FAMOTIDINE 20 MILLIGRAM(S): 10 INJECTION INTRAVENOUS at 07:34

## 2024-11-03 NOTE — OB RN PATIENT PROFILE - PURPOSEFUL PROACTIVE ROUNDING
Continued Stay Note  [facility]     Patient Name: Dieter Christiansen  MRN: [id number]  Today's Date: 5/23/2023    Admit Date: 5/19/2023    Plan: Home at [place]   Discharge Plan     Row Name 05/23/23 1505       Plan    Plan Home at [place]    Patient/Family in Agreement with Plan yes    Plan Comments Charge nurse has made an appointment at  Hand/Ortho for Thursday 5- at 1310. [phone] infusion time for Thursday to not confict with his Hand appointment. I am waiting on them to get back to me on acceptance and a time. I have spoken with the pts daughter. She states she can transport the pt in the car daily and is aware of the  appointment time. The daughter will need to learn the wound care and be sent home with wound care supplies. The daughter states she is a CNA. He will not be able to have HH while going outpt for infusion. I spoke with Chase with PT wound. Best to have the pt F/U at  for further wound care recomendations. PT wound not seeing the pt currently. I have updated the pt.    Final Discharge Disposition Code 01 - home or self-care               Discharge Codes    No documentation.               Expected Discharge Date and Time     Expected Discharge Date Expected Discharge Time    May 24, 2023             Karen Reynolds RN     Patient

## 2024-11-03 NOTE — OB NEONATOLOGY/PEDIATRICIAN DELIVERY SUMMARY - NS_MECONIUTRACHA_OBGYN_ALL_OB
10/24/24 0953 10/24/24 1331   Vital Signs   /61 (!) 146/58   Temp 98.2 °F (36.8 °C) 98.1 °F (36.7 °C)   Pulse 70 77   Respirations 16 16       Treatment time: 4 hours ordered, see summary below  Net UF: 3 L    Pre weight: 98.9 kg (standing scale)  Post weight: 95.9 kg (standing scale)  EDW: 94 kg    Access used: [facility] HD tunneled cath  Access function: No problems    Medications or blood products given: None    Regular outpatient schedule: [facility]    Summary of response to treatment: Tolerated 3.5 hour HD tx without difficulty, refused to do ordred 4 hour HD tx, Dr. Guy aware. Initially refused to come to HD this am, finally agreed to HD, then wanted to eat prior to coming to HD. Vital signs stable throughout tx.     Copy of dialysis treatment record placed in chart, to be scanned into EMR.    Report called to Yogesh Rodrigez RN  
None

## 2024-11-03 NOTE — DISCHARGE NOTE OB - NS MD DC FALL RISK RISK
For information on Fall & Injury Prevention, visit: [email or web address] OR  [email or web address] OR  https://www.cdc.gov/steadi/patient.html

## 2024-11-03 NOTE — DISCHARGE NOTE OB - FINANCIAL ASSISTANCE
[facility] provides services at a reduced cost to those who are determined to be eligible through [facility]’s financial assistance program. Information regarding [facility]’s financial assistance program can be found by going to [email or web address] or by calling [phone].

## 2024-11-03 NOTE — DISCHARGE NOTE OB - HOSPITAL COURSE
multipara at 36.6 weeks, with known fetus with duodenal atresia and polyhydramnios presented with PPROM and uterine ctx's and fetus in breech presentation. huddle was held and plan is for  section. pt underwent primary LST C/S  and delivered a live female , pt did well subsequently and was discharged home

## 2024-11-03 NOTE — DISCHARGE NOTE OB - SECONDARY DIAGNOSIS.
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

## 2024-11-03 NOTE — OB PROVIDER H&P - HISTORY OF PRESENT ILLNESS
38y/o  @36.6wks gestation presents with leaking of fluid at 0600am, yellow tinged fluid, verbalizes occasional contraction pain 4/10 on pain scale.  Patient has a history of polyhydramnios in this pregnancy recent CANDY on 2024 48.71.  Fetal alert for Duodenal Atresia.  Patient denies vaginal bleeding, headache, chest pain, epigastric pain, blurred vision, SOB.  Endorse +FM     PNC: Dr. Doran, high risk   -Severe Polyhydramios   -10/11/2024 S/P Fetal MRI: Proximal duodenal obstruction  -MFM sonogram 2024 Cephalic, anterior placenta, CANDY 48.71cm, BPP 8/8

## 2024-11-03 NOTE — OB PROVIDER H&P - NSHPPHYSICALEXAM_GEN_ALL_CORE
T(C): 36.5 (11-03-24 @ 06:45), Max: 36.5 (11-03-24 @ 06:27)  HR: 74 (11-03-24 @ 06:49) (73 - 74)  BP: 112/74 (11-03-24 @ 06:49) (112/74 - 115/75)  RR: 16 (11-03-24 @ 06:45) (16 - 16)    Physical Exam  Gen: NAD  Cardiac: RRR  Pulm: Unlabored, breathing comfortably on room air  Abdomen: soft, nontender, gravid    TAUS: Breech presentation   SSE: Positive pooling, Positive Nitrazine, Positive Fern, Cervix appears dilated   VE: 2/50/-3  EFM: 140bpm/ moderate variability/ no accels, variabe decels/ Non reactive NST   [place]: Irregular Q2-3min

## 2024-11-03 NOTE — OB PROVIDER DELIVERY SUMMARY - NSSELHIDDEN_OBGYN_ALL_OB_FT
[NS_DeliveryAttending1_OBGYN_ALL_OB_FT:[id number]],[NS_DeliveryRN_OBGYN_ALL_OB_FT:[email or web address]],[NS_DeliveryAssist1_OBGYN_ALL_OB_FT:[id number]=]

## 2024-11-03 NOTE — PROVIDER CONTACT NOTE (CHANGE IN STATUS NOTIFICATION) - RECOMMENDATIONS
RN contacted OB resident. MD Quezada to come to bedside and evaluate patient.
RN requested MD to be at bedside. Recommended starting fluid bolus.

## 2024-11-03 NOTE — OB PROVIDER DELIVERY SUMMARY - NSPROVIDERDELIVERYNOTE_OBGYN_ALL_OB_FT
pLTCS 2/2 PROM at 36w6d breech    Viable female infant, 2450g, 2/5/8 APGARS, breech presentation.    Hysterotomy was closed in one layer with 0 Vicryl. Peritoneum reapproximated with 2-0 Vicryl. Fascia closed with 0 Vicryl. Subcutaneous reapproximated with 2-0 plain gut. Subcuticular skin closure with 3-0 monocryl.    QBL: 877  IVF: 1500  UOP: 200

## 2024-11-03 NOTE — CHART NOTE - NSCHARTNOTEFT_GEN_A_CORE
Patient seen and evaluated at bedside due to call from RN that patient continues to feel unwell since last evaluation.  Patient endorses continued epigastric discomfort, hiccuping, and R shoulder pain.  States that she tried incentive spirometer once in the past hour.  States that Simethicone initially improved symptoms, but states epigastric discomfort has returned.  Reports intermittent nausea/feeling clammy that improved earlier after small amount of emesis.  During evaluation patient reported return of nausea and clamminess with episode of small amount of emesis.  Patient reported she felt better after vomiting.  Patient denies abdominal pain, chest pain, shortness of breath.      Vital Signs Last 24 Hrs  T(C): 36.4 (03 Nov 2024 09:30), Max: 36.8 (03 Nov 2024 07:11)  T(F): 97.5 (03 Nov 2024 09:30), Max: 98.2 (03 Nov 2024 07:11)  HR: 97 (03 Nov 2024 18:00) (61 - 127)  BP: 103/74 (03 Nov 2024 18:00) (72/51 - 123/105)  BP(mean): 83 (03 Nov 2024 18:00) (59 - 112)  RR: 15 (03 Nov 2024 18:00) (9 - 29)  SpO2: 100% (03 Nov 2024 18:00) (92% - 100%)    Parameters below as of 03 Nov 2024 07:11  Patient On (Oxygen Delivery Method): room air      PHYSICAL EXAM:   Gen: appears pale, alert and oriented x 3  Cardiovascular: mildly tachycardic to low 100s,  HR jumped to 130s while patient actively vomiting  Respiratory: breathing comfortably on RA  Abd: soft, softly distended, tympanic in upper abdomen, appropriately tender, fundus firm  Incision: pressure dressing CDI  : normal lochia under patient, no additional bleeding with palpation of fundus  Extremities: non-tender b/l, no edema, venodynes in place               10.0   29.93 )-----------( 252      ( 11-03 @ 17:11 )             29.3                9.3    21.35 )-----------( 232      ( 11-03 @ 10:11 )             27.6                11.8   16.39 )-----------( 248      ( 11-03 @ 07:05 )             34.3     A/P: POD#0 from pLTCS 2/2 breech, , c/b post-op hypotension (BPs 70/40s) now sp 1u pRBC with improvement in BPs. Patient evaluated for persistent hiccuping, shoulder pain, and epigastric discomfort.  Also with small amount of emesis.  VSS, urine output adequate.  Posttransfusion CBC with appropriate rise.  Abdomen soft, appropriately tender, tympanic.  Complaints likely due to post-op gas pain vs atelectasis vs delayed reaction to anesthesia.   - Will continue to monitor in PACU  - For IV Reglan for nausea    d/w Dr. Fani Lewis PGY3 Patient seen and evaluated at bedside due to call from RN that patient continues to feel unwell since last evaluation.  Patient endorses continued epigastric discomfort, hiccuping, and R shoulder pain.  States that she tried incentive spirometer once in the past hour.  States that Simethicone initially improved symptoms, but states epigastric discomfort has returned.  Reports intermittent nausea/feeling clammy that improved earlier after small amount of emesis.  During evaluation patient reported return of nausea and clamminess with episode of small amount of emesis.  Patient reported she felt better after vomiting.  Patient denies abdominal pain, chest pain, shortness of breath.      Vital Signs Last 24 Hrs  T(C): 36.4 (03 Nov 2024 09:30), Max: 36.8 (03 Nov 2024 07:11)  T(F): 97.5 (03 Nov 2024 09:30), Max: 98.2 (03 Nov 2024 07:11)  HR: 97 (03 Nov 2024 18:00) (61 - 127)  BP: 103/74 (03 Nov 2024 18:00) (72/51 - 123/105)  BP(mean): 83 (03 Nov 2024 18:00) (59 - 112)  RR: 15 (03 Nov 2024 18:00) (9 - 29)  SpO2: 100% (03 Nov 2024 18:00) (92% - 100%)    Parameters below as of 03 Nov 2024 07:11  Patient On (Oxygen Delivery Method): room air      PHYSICAL EXAM:   Gen: appears pale, alert and oriented x 3  Cardiovascular: mildly tachycardic to low 100s,  HR jumped to 130s while patient actively vomiting  Respiratory: breathing comfortably on RA  Abd: soft, softly distended, tympanic in upper abdomen, appropriately tender, fundus firm  Incision: pressure dressing CDI  : normal lochia under patient, no additional bleeding with palpation of fundus  Extremities: non-tender b/l, no edema, venodynes in place               10.0   29.93 )-----------( 252      ( 11-03 @ 17:11 )             29.3                9.3    21.35 )-----------( 232      ( 11-03 @ 10:11 )             27.6                11.8   16.39 )-----------( 248      ( 11-03 @ 07:05 )             34.3     A/P: POD#0 from pLTCS 2/2 breech, , c/b post-op hypotension (BPs 70/40s) now sp 1u pRBC with improvement in BPs. Patient evaluated for persistent hiccuping, shoulder pain, and epigastric discomfort.  Also with small amount of emesis.  VSS, urine output adequate.  Posttransfusion CBC with appropriate rise.  Abdomen soft, appropriately tender, tympanic.  Complaints likely due to post-op gas pain vs atelectasis vs delayed reaction to anesthesia.   - Will continue to monitor in PACU  - For IV Reglan for nausea    d/w Dr. Fani Lewis PGY3      **Addendum**  After further discussion, given epigastric discomfort and episode of vomiting, decision made to order repeat CBC, CMP, with amylase and lipase.  Repeat labs reviewed showing H/H 8.9/26.7 and amylase 660/lipase 28.  Given stable vital signs and symptomatic improvement, H/H likely equilibration.  Amylase possibly elevated in setting of recent emesis.  Will repeat labwork in AM and consider further imaging with RU sono if amylase elevated.      d/w Dr. Fani Lewis PGY3

## 2024-11-03 NOTE — CHART NOTE - NSCHARTNOTEFT_GEN_A_CORE
R3 OB Eval    Pt seen at bedside due to report of shoulder pain and difficulty taking deep breath per RN. Pt reports since delivery she has experienced R shoulder pain, hiccuping, and difficulty taking a deep breath. Pt denies chest pain, palpitations. Abdominal pain well controlled.    Vital Signs Last 24 Hrs  T(C): 36.4 (03 Nov 2024 09:30), Max: 36.8 (03 Nov 2024 07:11)  T(F): 97.5 (03 Nov 2024 09:30), Max: 98.2 (03 Nov 2024 07:11)  HR: 76 (03 Nov 2024 16:00) (61 - 127)  BP: 99/69 (03 Nov 2024 16:00) (72/51 - 123/105)  BP(mean): 79 (03 Nov 2024 16:00) (59 - 112)  RR: 18 (03 Nov 2024 16:00) (9 - 29)  SpO2: 100% (03 Nov 2024 16:00) (92% - 100%)    Parameters below as of 03 Nov 2024 07:11  Patient On (Oxygen Delivery Method): room air    Physical Exam:  Gen: NAD, appears pale  CV: clinically well perfused  Lungs: CTAB  Abd: soft, fundus firm, appropriately tender  Incision: pressure dressing CDI  : underlying pad 20% saturated, no active bleeding with fundal pressure  Ext: non-tender, non-distended b/l, venodynes in place    A/P: POD#0 from pLTCS 2/2 breech, , c/b post-op hypotension (BPs 70/40s) now sp 1u pRBC with improvement in BPs. Pt reporting shoulder pain and difficulty taking deep breath, likely post-op gas pain vs atelectasis vs symptomatic anemia vs r/o alternate etiology. VSS; SpO2 % on RA.     -Incentive spirometer teaching performed  -Simethicone, pain control  -F/u post-transfusion CBC    [facility]

## 2024-11-03 NOTE — OB RN PATIENT PROFILE - POST PARTUM DEPRESSION SCREEN OB 4
[address]  [address]                            OPERATIVE REPORT      PATIENT NAME: ISMAEL SOLIS         : 1971  MED REC NO: [id number]                       ROOM: 2212  ACCOUNT NO: [id number]                       ADMIT DATE: 2024  PROVIDER: Bill Guido MD    DATE OF SERVICE:  2024    PREOPERATIVE DIAGNOSES:       1. End-stage arthritis of the left knee.     2. Stage 3 morbid obesity, BMI 46.     3. Instability and recurvatum.    POSTOPERATIVE DIAGNOSES:       1. End-stage arthritis of the left knee.     2. Stage 3 morbid obesity, BMI 46.     3. Instability and recurvatum.    PROCEDURES PERFORMED:  Left total knee replacement using the [employer] Julia II System with a size 5 left posterior stabilized Juila II Oxinium femoral component, a size 4 left tibial baseplate, a 13 mm size 3-4 legion high flexion articular insert, and a 32 asymmetric patella.    SURGEON:  Bill Guido MD    ASSISTANT:       1.  BESSIE Pa, first assistant.     2. Jayson Mann second assistant.    ANESTHESIA:  Dr. Ponce; preoperative nerve block with light general.     BESSIE Pa was the first assistant and assisted with all phases of surgery commencing with the patient positioning, patient prep, patient drape, leg positioning during surgery, retracting, assisting with the surgery itself, closure, dressing placement, and transfer.    ESTIMATED BLOOD LOSS:  Less than 50.    SPECIMENS REMOVED:  None.    INTRAOPERATIVE FINDINGS:  Same.     COMPLICATIONS:  None.    IMPLANTS:  As above-mentioned.    INDICATIONS:  Same.    DESCRIPTION OF PROCEDURE:  After anesthetic was successfully induced and antibiotics were confirmed given and a time-out, the knee was examined.  She was   recurvatum, had quite a significant varus malalignment approximately 15 to 18 degrees.  With this in mind, we would do a modified gap  no

## 2024-11-03 NOTE — DISCHARGE NOTE OB - DISCHARGE DATE
[FreeTextEntry1] : 41 y/o F with PSHx of VSG in 2012 with  at [facility]. Patient's PCP required cardio and hematological clearance prior to giving a letter of support for bariatric surgery, given patient's hx of PE in 2017 after a Tummy tuck. Patient will obtain these clearances and return here for another final visit and to schedule for a surgery date for her conversion of VSG to gastric bypass. 
07-Nov-2024

## 2024-11-03 NOTE — OB PROVIDER H&P - ATTENDING COMMENTS
term IUP with history of polyhydramnios and fetus with duodenal atresia, presented with SROM and regular painful ctx's, and found to have a fetus in the breech presentation. will proceed with C/XOCHITL Fallon M.D.

## 2024-11-03 NOTE — OB PROVIDER H&P - NS_CONSENTSAPP_OBGYN_ALL_OB
Health Maintenance Summary     Topic Due On Due Status Completed On    Colorectal Cancer Screening - Blood in Stool Test Dec 29, 2017 Not Due Dec 29, 2016    Diabetes Eye Exam Jun 30, 2016 Overdue Jun 30, 2015    Glycohemoglobin A1C  (Diabetes Sugar)  Jun 25, 2017 Not Due Mar 25, 2017    Microalbumin  (Diabetes Urine Test)  Jul 9, 2017 Not Due Jul 9, 2016    GFR  (Kidney Function Test)  Jul 9, 2017 Not Due Jul 9, 2016    Diabetes Foot Exam  Jul 13, 1978 Overdue     IMMUNIZATION - DTaP/Tdap/Td Dec 14, 2020 Not Due Dec 14, 2010    Immunization-Influenza  Completed Oct 27, 2016          Patient is due for topics as listed above, he wishes to discuss with provider .       N/A

## 2024-11-03 NOTE — PROVIDER CONTACT NOTE (CHANGE IN STATUS NOTIFICATION) - ASSESSMENT
Patient reports feeling right shoulder pain when taking deep breaths. Pain level 6/10. Patient reports entire body hurting when taking deep breath. Patient received IV Tylenol at 1000am. Patient refusing Toradol at this time.
RN assessed fundus at right of midline during fundal assessment. RN noted low urine output at that time and advanced mauro catheter. After advancement of catheter, received urine output of 45cc. An hour later, fundus still slightly to the right and urine ouput of 15cc was noted. RN contacted and notified MD Meraz.
RN repeated bp at bedside and received low reading. Patient appeared pale and weak. Patient complained of feeling lightheaded and nauseous.

## 2024-11-03 NOTE — OB PROVIDER H&P - SUPERVISING ATTENDING
Please call patient for Inpatient Discharge f/u 06/06/21. Acute Cholecystitis. Ruth Behrens.    Dr. Fallon

## 2024-11-03 NOTE — DISCHARGE NOTE OB - PATIENT PORTAL LINK FT
You can access the FollowMyHealth Patient Portal offered by [facility] by registering at the following website: [email or web address]. By joining [facility]’s FollowMyHealth portal, you will also be able to view your health information using other applications (apps) compatible with our system.

## 2024-11-03 NOTE — OB RN DELIVERY SUMMARY - NS_GENERALBABYACOMMENTA_OBGYN_ALL_OB_FT
STS not done due to infants disposition. Infant brought to warmer to be assessed by NICU team. Infant taken to NICU.

## 2024-11-03 NOTE — DISCHARGE NOTE OB - MEDICATION SUMMARY - MEDICATIONS TO TAKE
I will START or STAY ON the medications listed below when I get home from the hospital:    ibuprofen 600 mg oral tablet  -- 1 tab(s) by mouth 4 times a day  -- Indication: For  premature rupture of membranes   I will START or STAY ON the medications listed below when I get home from the hospital:    ibuprofen 600 mg oral tablet  -- 1 tab(s) by mouth every 6 hours as needed for  moderate pain  -- Indication: For moderate pain    acetaminophen 325 mg oral tablet  -- 3 tab(s) by mouth every 6 hours as needed for  mild pain  -- Indication: For mild pain

## 2024-11-03 NOTE — DISCHARGE NOTE OB - NS AS DC FU INST LIST INST
Physical Therapy Treatment    Patient Name:  Justin Adams   MRN:  [id number]    Recommendations:     Discharge Recommendations:  nursing facility, skilled   Discharge Equipment Recommendations: (ongoing assessment)   Barriers to discharge: Decreased caregiver support    Assessment:     Justin Adams is a 69 y.o. male admitted with a medical diagnosis of Other chest pain.  He presents with the following impairments/functional limitations:  weakness, impaired endurance, impaired functional mobilty, gait instability, impaired self care skills, impaired balance, decreased upper extremity function, decreased lower extremity function, decreased safety awareness, decreased ROM, impaired cardiopulmonary response to activity, pain, impaired cognition . Pt required frequent rest breaks throughout therapy activity 2* fatigue . Pt with max slow movement , required extra time to complete tasks.  Pt ambulated ~ 80 ft with rollator , CGA/MIN A ( chair followed and 2L o2nc). Noted with increase BUE tremors today, nurse Lennie notified. Pt will benefit from further skilled therapy in order to get back to PLOF.    Rehab Prognosis: Good; patient would benefit from acute skilled PT services to address these deficits and reach maximum level of function.    Recent Surgery: Procedure(s) (LRB):  LAPAROSCOPY, DIAGNOSTIC (N/A)  LAPAROTOMY, EXPLORATORY 16 Days Post-Op    Plan:     During this hospitalization, patient to be seen 6 x/week to address the identified rehab impairments via gait training, therapeutic activities, therapeutic exercises and progress toward the following goals:    · Plan of Care Expires:  03/21/19    Subjective     Chief Complaint: weakness, BUE tremor and cold. Nurse notified .   Patient/Family Comments/goals: to get back to PLOF.   Pain/Comfort:  · Pain Rating 1: 8/10  · Location 1: abdomen  · Pain Addressed 1: Pre-medicate for activity, Nurse notified  · Pain Rating Post-Intervention 1:  8/10      Objective:     Communicated with nurse Hogue prior to session.  Patient found HOB elevated with bed alarm, PICC line, telemetry, oxygen upon PT entry to room.     General Precautions: Standard, fall, respiratory   Orthopedic Precautions:N/A   Braces: N/A     Functional Mobility:  · Bed Mobility:     · Rolling Left:  contact guard assistance  · Scooting: contact guard assistance  · Supine to Sit: moderate assistance, HOB elevated.   · Transfers:     · Sit to Stand:  X 3 trials from bed  minimum assistance with 4 wheeled walker. VC's for safety awareness.   · Gait:  Pt ambulated ~ 80 ft with rollator , CGA/MIN A (chair followed , 2 L O2NC ). Noted with increased BUE tremors,  decreased shyam , decreased step length, decreased swing to stance phase . V/T cues for safety technique and rollator management.   · Balance: good in sitting, fair/fair- in sitting.         AM-PAC 6 CLICK MOBILITY  Turning over in bed (including adjusting bedclothes, sheets and blankets)?: 4  Sitting down on and standing up from a chair with arms (e.g., wheelchair, bedside commode, etc.): 3  Moving from lying on back to sitting on the side of the bed?: 2  Moving to and from a bed to a chair (including a wheelchair)?: 3  Need to walk in hospital room?: 3  Climbing 3-5 steps with a railing?: 2  Basic Mobility Total Score: 17       Therapeutic Activities and Exercises:   pt performed bed mobility, transfer and gait training as above.   Pt performed seated BLE x 15 reps : AP, LAQ.   Pt tolerated standing balance with rollator CGA for yomaira-care and don/doff diaper.      Patient left up in chair ON CUSHION with all lines intact, call button in reach, chair alarm on, Nurse Hogue  notified and sister present..    GOALS:   Multidisciplinary Problems     Physical Therapy Goals        Problem: Physical Therapy Goal    Goal Priority Disciplines Outcome Goal Variances Interventions   Physical Therapy Goal     PT, PT/OT Ongoing (interventions  implemented as appropriate)     Description:  Goals to be met by: 3/21/2019     Patient will increase functional independence with mobility by performin. Supine to sit with Stand-by Assistance  2. Sit to stand transfer with Stand-by Assistance  3. Gait  x 100 feet with Stand-by Assistance using rollator.   4. Lower extremity exercise program x10 reps per handout, with supervision                      Time Tracking:     PT Received On: 19  PT Start Time: 1539     PT Stop Time: 1633  PT Total Time (min): 54 min     Billable Minutes: Gait Training 27 and Total Time 54 min co-treat with OT    Treatment Type: Treatment  PT/PTA: PTA     PTA Visit Number: 2     Tram T Soniya, PTA  2019   no

## 2024-11-03 NOTE — OB RN TRIAGE NOTE - NSMATERNALFETALCONCERNS_OBGYN_ALL_OB_FT
Maternal/Fetal Alert  10/11/2024 S/P Fetal MRI: Proximal duodenal obstruction. Pt with severe Polyhydramnios-S/P Amnioreduction 10/3/2024. Peds Surgery/NICU/[facility] JAMESON Pending. Suly Monique RN-BC  ALERT NICU  TX TO DR YANCEY for OB CARE  IOL 11/5/2024  S/P FETAL [facility]  MDM 10/31/2024   SEVERE POLYHYDRAMNIOS WITH UNSTABLE LIE ; ASSES PRIOR TO DELIVERY WITH MFM

## 2024-11-03 NOTE — PROVIDER CONTACT NOTE (CHANGE IN STATUS NOTIFICATION) - ACTION/TREATMENT ORDERED:
MD Meraz suggested saline flush of mauro and bladder scan. RN flushed 10cc and received 15cc of additional urine. Bladder scan performed and 127cc noted on scan. RN notified MD Sweeney. Was told to continue monitoring for the next 30 minutes.

## 2024-11-03 NOTE — OB RN PATIENT PROFILE - NSMATERNALFETALCONCERNS_OBGYN_ALL_OB_FT
Maternal/Fetal Alert  10/11/2024 S/P Fetal MRI: Proximal duodenal obstruction. Pt with severe Polyhydramnios-S/P Amnioreduction 10/3/2024. Peds Surgery/NICU/[facility] JAEMSON Pending. Suly Monique RN-BC  ALERT NICU  TX TO DR YANCEY for OB CARE  IOL 11/5/2024  S/P FETAL [facility]  MDM 10/31/2024   SEVERE POLYHYDRAMNIOS WITH UNSTABLE LIE ; ASSES PRIOR TO DELIVERY WITH MFM

## 2024-11-03 NOTE — OB RN PATIENT PROFILE - NSSDOHLAW_OBGYN_A_OB
Sasha Garcia  Pediatrics  [address] [place] [address]  Phone: [phone]  Fax: [phone]  Follow Up Time: 1-3 days   I do not need any legal help

## 2024-11-03 NOTE — OB PROVIDER H&P - ASSESSMENT
40y/o  @36.6wks gestation admit for premature rupture of membranes     -Admit to L&D   -Fetus is currently in breech position, Plan for Primary    -Routine labs   -IV fluids   -Pre-op antibiotics/medications   Blood transfusion and induction/labor consents obtained. Risks, benefits, alternatives and possible complications have been discussed in detail with the patient in her native language. Pre-admission, admission, and post admission procedures and expectations were discussed in detail. All questions answered, all appropriate hospital consents were signed. Anticipate normal vaginal delivery. Informed Consent was obtained. The following was discussed:   Section: (surgical cut in the abdomen in order to deliver the baby)    D/W Dr. Leeanne Fernandez, NP

## 2024-11-03 NOTE — OB PROVIDER H&P - CURRENT PREGNANCY COMPLICATIONS, OB PROFILE
See Fetal Alert notes for Duodenal obstruction and NICU alert/Polyhydramnios/ Premature Rupture of Membranes (PPROM)/Fetal Anomalies

## 2024-11-03 NOTE — PROVIDER CONTACT NOTE (CHANGE IN STATUS NOTIFICATION) - ACTION/TREATMENT ORDERED:
MD requested 500 cc LR bolus. MD appeared at bedside. Requested labs (CMP, CBC, and Coags studies) to be sent. MD Sweeney requested 1 unit of blood to be administered. MD requested 500 cc LR bolus. MD appeared at bedside. Requested labs (CMP, CBC, and Coags studies) to be sent. MD Sweeney requested 1 unit of blood to be administered.     10:30  lab results returned. Provider contacted RN to NOT administer unit of PRBC's. RN reported patient's BP still low- requested to give another bolus. MD Sweeney ok'd 1,000 cc bolus. RN administered bolus.     1130 MD Fallon at bedside to evaluate patient. Confirmed of patient's poor status (i.e. pale). MD Fallon requested Unit of PRBC's to be administered. RN Started PRBC's transfusion at 1212.

## 2024-11-03 NOTE — OB RN DELIVERY SUMMARY - NSSELHIDDEN_OBGYN_ALL_OB_FT
[NS_DeliveryAttending1_OBGYN_ALL_OB_FT:[id number]],[NS_DeliveryRN_OBGYN_ALL_OB_FT:[email or web address]],[NS_DeliveryAssist1_OBGYN_ALL_OB_FT:[id number]=]

## 2024-11-03 NOTE — OB RN TRIAGE NOTE - NSICDXPASTSURGICALHX_GEN_ALL_CORE_FT
PAST SURGICAL HISTORY:  H/O wrist surgery     History of appendectomy     History of cholecystectomy     History of repair of ACL

## 2024-11-03 NOTE — DISCHARGE NOTE OB - PLAN OF CARE
normal activity, regular diet follow up in office in 6 weeks. After discharge, please stay on pelvic rest for 6 weeks, meaning no sexual intercourse, no tampons and no douching.  No driving for 2 weeks as women can loose a lot of blood during delivery and there is a possibility of being lightheaded/fainting.  No lifting objects heavier than baby for two weeks.  Expect to have vaginal bleeding/spotting for up to six weeks.  The bleeding should get lighter and more white/light brown with time.  For bleeding soaking more than a pad an hour or passing clots greater than the size of your fist, come in to the emergency department.    Follow up in OB office in 1-2 weeks for incision check.  Call for noticeable increase in redness or swelling at incision, discharge from incision, or opening of skin at incision site

## 2024-11-03 NOTE — OB PROVIDER H&P - NS_OBGYNHISTORY_OBGYN_ALL_OB_FT
OBHx  FT  2014 7#7  FT  2016 7#10  FT  10/5/2017 7#2    GYNHx  Denies history of abnormal pap smear, fibroids, ovarian cysts, STDs

## 2024-11-03 NOTE — CHART NOTE - NSCHARTNOTEFT_GEN_A_CORE
Patient seen at bedside due to hypotension 80s/60s. Patient at bedside noted to feeling subjective dizziness. HR 80-90s, most recent blood pressure 90s/60s. Patient notes baseline lower blood pressures.  Bedside ultrasound with thin endometrial stripe   Abdomen exam with firm fundus      Plan:   - Low concern for active bleeding at this time   - Continue 500cc bolus  - Stat CBC, CMP, Coags    D/w Dr. Fallon   Seen with Dr. Mreaz, PGY3     Anna Tuttle, PGY2

## 2024-11-03 NOTE — PROVIDER CONTACT NOTE (CHANGE IN STATUS NOTIFICATION) - BACKGROUND
Patient had hypotensive episodes, received multiple bolus of LR, and received 1 unit of PRBC's. Patient due for repeat CBC at 1700.
[id number] patient delivered viable female 11/3 at 0829 via  section. QBL 877ml. Patient reported "normal" ranges of low 100s SBP and 70s DBP. Patient received ephedrine post-op administered by Anesthesiologist at 0919.

## 2024-11-03 NOTE — PROVIDER CONTACT NOTE (CHANGE IN STATUS NOTIFICATION) - SITUATION
Patient s/p  section. Patient having low blood pressures 80/60s and dropping into the 70s/50s. Patient complained of nausea and lightheadedness. Patient appeared pale.
 patient s/p  section. RN Concerned regarding patient's low urine output of 15ml between 9575-9412.
Patient is s/p  section. Having hypotensive episodes. Assessed by MD Meraz and MD Sweeney - 1 unit of PRBC's to be administered - pending it's arrival from blood bank.

## 2024-11-03 NOTE — OB NEONATOLOGY/PEDIATRICIAN DELIVERY SUMMARY - NSPEDSNEONOTESA_OBGYN_ALL_OB_FT
NICU resus called to delivery for fetal alert of duodenal atresia. 36.6wk female born via CS to a 37y/o  mother. Fetal alert for proximal duodenal obstruction found on 10/11/24 on fetal MRI. Mom with severe polyhydramnios requiring amnioreduction on 10/03/24. NICU and surgical team aware. Maternal history of cholelithiasis and asthma . Maternal labs include Blood Type A+ , HIV - , RPR NR , Rubella I , Hep B - , GBS - on 10/28. ROM at 06:00 on  with light meconium fluids (ROM hours: 2H29M). Breech positioning. Baby emerged apneic with poor tone and color. Cord clamping was not delayed. Baby was warmed, dried, suctioned, and stimulated. Resuscitation efforts included tracheal suctioning (copious fluids suctioned), PPV for approximately 2 minutes and tolerated transition to CPAP, max setting CPAP 5/60%. Replogle placed. APGARS of 2/5/8. Attending Dr. Miranda (neonatologist) present during resuscitation. Highest maternal temp: 36.8C. EOS 0.12. Mom plans to initiate breastfeeding and formula feedin and consents Hep B vaccine. Baby stable for transfer to NICU for CPAP respiratory support and further duodenal atresia workup. Parents updated.    :   TOB: 08:29 NICU resus called to delivery for fetal alert of duodenal atresia. 36.6wk female born via CS to a 37y/o  mother. Fetal alert for proximal duodenal obstruction found on 10/11/24 on fetal MRI. Mom with severe polyhydramnios requiring amnioreduction on 10/03/24. NICU and surgical team aware. Maternal history of cholelithiasis and asthma . Maternal labs include Blood Type A+ , HIV - , RPR NR , Rubella I , Hep B - , GBS - on 10/28. ROM at 06:00 on  with light meconium fluids (ROM hours: 2H29M). Breech positioning. Baby emerged apneic with poor tone and color. Cord clamping was not delayed. Baby was warmed, dried, suctioned, and stimulated. Resuscitation efforts included tracheal suctioning (copious fluids suctioned), PPV for approximately 2 minutes and tolerated transition to CPAP, max setting CPAP 5/80%. Replogle placed. APGARS of 2/5/8. Attending Dr. Miranda (neonatologist) present during resuscitation. Highest maternal temp: 36.8C. EOS 0.12. Mom plans to initiate breastfeeding and formula feedin and consents Hep B vaccine. Baby stable for transfer to NICU for CPAP respiratory support and further duodenal atresia workup. Parents updated.    :   TOB: 08:29

## 2024-11-03 NOTE — OB RN DELIVERY SUMMARY - NS_SEPSISRSKCALC_OBGYN_ALL_OB_FT
EOS calculated successfully. EOS Risk Factor: 0.5/1000 live births ([facility] national incidence); GA=36w6d; Temp=98.2; ROM=2.483; GBS='Negative'; Antibiotics='No antibiotics or any antibiotics < 2 hrs prior to birth'

## 2024-11-03 NOTE — DISCHARGE NOTE OB - CARE PROVIDER_API CALL
Hollis Doran  Obstetrics and Gynecology  [address]  [place] [address]  Phone: [phone]  Fax: [phone]  Follow Up Time:

## 2024-11-03 NOTE — DISCHARGE NOTE OB - CARE PLAN
Principal Discharge DX:	 premature rupture of membranes, delivered, current hospitalization  Assessment and plan of treatment:	normal activity, regular diet follow up in office in 6 weeks.  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  No driving for 2 weeks as women can loose a lot of blood during delivery and there is a possibility of being lightheaded/fainting.  No lifting objects heavier than baby for two weeks.  Expect to have vaginal bleeding/spotting for up to six weeks.  The bleeding should get lighter and more white/light brown with time.  For bleeding soaking more than a pad an hour or passing clots greater than the size of your fist, come in to the emergency department.    Follow up in OB office in 1-2 weeks for incision check.  Call for noticeable increase in redness or swelling at incision, discharge from incision, or opening of skin at incision site

## 2024-11-04 ENCOUNTER — APPOINTMENT (OUTPATIENT)
Dept: OBGYN | Facility: CLINIC | Age: 39
End: 2024-11-04

## 2024-11-04 ENCOUNTER — APPOINTMENT (OUTPATIENT)
Dept: ANTEPARTUM | Facility: CLINIC | Age: 39
End: 2024-11-04

## 2024-11-04 LAB
ALBUMIN SERPL ELPH-MCNC: 2.5 G/DL — LOW (ref 3.3–5)
ALBUMIN SERPL ELPH-MCNC: 2.7 G/DL — LOW (ref 3.3–5)
ALP SERPL-CCNC: 103 U/L — SIGNIFICANT CHANGE UP (ref 40–120)
ALP SERPL-CCNC: 104 U/L — SIGNIFICANT CHANGE UP (ref 40–120)
ALT FLD-CCNC: 47 U/L — HIGH (ref 4–33)
ALT FLD-CCNC: 48 U/L — HIGH (ref 4–33)
AMYLASE P1 CFR SERPL: 506 U/L — HIGH (ref 25–125)
AMYLASE P1 CFR SERPL: 521 U/L — HIGH (ref 25–125)
ANION GAP SERPL CALC-SCNC: 12 MMOL/L — SIGNIFICANT CHANGE UP (ref 7–14)
ANION GAP SERPL CALC-SCNC: 13 MMOL/L — SIGNIFICANT CHANGE UP (ref 7–14)
AST SERPL-CCNC: 36 U/L — HIGH (ref 4–32)
AST SERPL-CCNC: 36 U/L — HIGH (ref 4–32)
BASOPHILS # BLD AUTO: 0.03 K/UL — SIGNIFICANT CHANGE UP (ref 0–0.2)
BASOPHILS NFR BLD AUTO: 0.2 % — SIGNIFICANT CHANGE UP (ref 0–2)
BILIRUB SERPL-MCNC: 0.3 MG/DL — SIGNIFICANT CHANGE UP (ref 0.2–1.2)
BILIRUB SERPL-MCNC: 0.4 MG/DL — SIGNIFICANT CHANGE UP (ref 0.2–1.2)
BUN SERPL-MCNC: 8 MG/DL — SIGNIFICANT CHANGE UP (ref 7–23)
BUN SERPL-MCNC: 9 MG/DL — SIGNIFICANT CHANGE UP (ref 7–23)
CALCIUM SERPL-MCNC: 8.3 MG/DL — LOW (ref 8.4–10.5)
CALCIUM SERPL-MCNC: 8.5 MG/DL — SIGNIFICANT CHANGE UP (ref 8.4–10.5)
CHLORIDE SERPL-SCNC: 100 MMOL/L — SIGNIFICANT CHANGE UP (ref 98–107)
CHLORIDE SERPL-SCNC: 99 MMOL/L — SIGNIFICANT CHANGE UP (ref 98–107)
CO2 SERPL-SCNC: 18 MMOL/L — LOW (ref 22–31)
CO2 SERPL-SCNC: 21 MMOL/L — LOW (ref 22–31)
CREAT SERPL-MCNC: 0.87 MG/DL — SIGNIFICANT CHANGE UP (ref 0.5–1.3)
CREAT SERPL-MCNC: 0.88 MG/DL — SIGNIFICANT CHANGE UP (ref 0.5–1.3)
EGFR: 86 ML/MIN/1.73M2 — SIGNIFICANT CHANGE UP
EGFR: 87 ML/MIN/1.73M2 — SIGNIFICANT CHANGE UP
EOSINOPHIL # BLD AUTO: 0.08 K/UL — SIGNIFICANT CHANGE UP (ref 0–0.5)
EOSINOPHIL NFR BLD AUTO: 0.4 % — SIGNIFICANT CHANGE UP (ref 0–6)
GLUCOSE SERPL-MCNC: 86 MG/DL — SIGNIFICANT CHANGE UP (ref 70–99)
GLUCOSE SERPL-MCNC: 95 MG/DL — SIGNIFICANT CHANGE UP (ref 70–99)
HCT VFR BLD CALC: 22 % — LOW (ref 34.5–45)
HCT VFR BLD CALC: 22.2 % — LOW (ref 34.5–45)
HCT VFR BLD CALC: 22.7 % — LOW (ref 34.5–45)
HGB BLD-MCNC: 7.6 G/DL — LOW (ref 11.5–15.5)
HGB BLD-MCNC: 7.6 G/DL — LOW (ref 11.5–15.5)
HGB BLD-MCNC: 7.7 G/DL — LOW (ref 11.5–15.5)
IANC: 14.56 K/UL — HIGH (ref 1.8–7.4)
IMM GRANULOCYTES NFR BLD AUTO: 1.7 % — HIGH (ref 0–0.9)
LIDOCAIN IGE QN: 27 U/L — SIGNIFICANT CHANGE UP (ref 7–60)
LIDOCAIN IGE QN: 28 U/L — SIGNIFICANT CHANGE UP (ref 7–60)
LYMPHOCYTES # BLD AUTO: 12.2 % — LOW (ref 13–44)
LYMPHOCYTES # BLD AUTO: 2.27 K/UL — SIGNIFICANT CHANGE UP (ref 1–3.3)
MCHC RBC-ENTMCNC: 28.9 PG — SIGNIFICANT CHANGE UP (ref 27–34)
MCHC RBC-ENTMCNC: 29.6 PG — SIGNIFICANT CHANGE UP (ref 27–34)
MCHC RBC-ENTMCNC: 29.8 PG — SIGNIFICANT CHANGE UP (ref 27–34)
MCHC RBC-ENTMCNC: 33.5 G/DL — SIGNIFICANT CHANGE UP (ref 32–36)
MCHC RBC-ENTMCNC: 34.5 G/DL — SIGNIFICANT CHANGE UP (ref 32–36)
MCHC RBC-ENTMCNC: 34.7 G/DL — SIGNIFICANT CHANGE UP (ref 32–36)
MCV RBC AUTO: 85.4 FL — SIGNIFICANT CHANGE UP (ref 80–100)
MCV RBC AUTO: 86.3 FL — SIGNIFICANT CHANGE UP (ref 80–100)
MCV RBC AUTO: 86.3 FL — SIGNIFICANT CHANGE UP (ref 80–100)
MONOCYTES # BLD AUTO: 1.39 K/UL — HIGH (ref 0–0.9)
MONOCYTES NFR BLD AUTO: 7.5 % — SIGNIFICANT CHANGE UP (ref 2–14)
NEUTROPHILS # BLD AUTO: 14.56 K/UL — HIGH (ref 1.8–7.4)
NEUTROPHILS NFR BLD AUTO: 78 % — HIGH (ref 43–77)
NRBC # BLD: 0 /100 WBCS — SIGNIFICANT CHANGE UP (ref 0–0)
NRBC # FLD: 0 K/UL — SIGNIFICANT CHANGE UP (ref 0–0)
PLATELET # BLD AUTO: 224 K/UL — SIGNIFICANT CHANGE UP (ref 150–400)
PLATELET # BLD AUTO: 232 K/UL — SIGNIFICANT CHANGE UP (ref 150–400)
PLATELET # BLD AUTO: 250 K/UL — SIGNIFICANT CHANGE UP (ref 150–400)
POTASSIUM SERPL-MCNC: 4.1 MMOL/L — SIGNIFICANT CHANGE UP (ref 3.5–5.3)
POTASSIUM SERPL-MCNC: 4.3 MMOL/L — SIGNIFICANT CHANGE UP (ref 3.5–5.3)
POTASSIUM SERPL-SCNC: 4.1 MMOL/L — SIGNIFICANT CHANGE UP (ref 3.5–5.3)
POTASSIUM SERPL-SCNC: 4.3 MMOL/L — SIGNIFICANT CHANGE UP (ref 3.5–5.3)
PROT SERPL-MCNC: 4.8 G/DL — LOW (ref 6–8.3)
PROT SERPL-MCNC: 4.9 G/DL — LOW (ref 6–8.3)
RBC # BLD: 2.55 M/UL — LOW (ref 3.8–5.2)
RBC # BLD: 2.6 M/UL — LOW (ref 3.8–5.2)
RBC # BLD: 2.63 M/UL — LOW (ref 3.8–5.2)
RBC # FLD: 13.2 % — SIGNIFICANT CHANGE UP (ref 10.3–14.5)
RBC # FLD: 13.3 % — SIGNIFICANT CHANGE UP (ref 10.3–14.5)
RBC # FLD: 13.5 % — SIGNIFICANT CHANGE UP (ref 10.3–14.5)
SODIUM SERPL-SCNC: 130 MMOL/L — LOW (ref 135–145)
SODIUM SERPL-SCNC: 133 MMOL/L — LOW (ref 135–145)
WBC # BLD: 16.63 K/UL — HIGH (ref 3.8–10.5)
WBC # BLD: 17.61 K/UL — HIGH (ref 3.8–10.5)
WBC # BLD: 18.64 K/UL — HIGH (ref 3.8–10.5)
WBC # FLD AUTO: 16.63 K/UL — HIGH (ref 3.8–10.5)
WBC # FLD AUTO: 17.61 K/UL — HIGH (ref 3.8–10.5)
WBC # FLD AUTO: 18.64 K/UL — HIGH (ref 3.8–10.5)

## 2024-11-04 PROCEDURE — 74177 CT ABD & PELVIS W/CONTRAST: CPT | Mod: 26

## 2024-11-04 RX ORDER — ACETAMINOPHEN 500 MG
975 TABLET ORAL
Refills: 0 | Status: DISCONTINUED | OUTPATIENT
Start: 2024-11-04 | End: 2024-11-07

## 2024-11-04 RX ORDER — IBUPROFEN 200 MG
600 TABLET ORAL EVERY 6 HOURS
Refills: 0 | Status: DISCONTINUED | OUTPATIENT
Start: 2024-11-04 | End: 2024-11-07

## 2024-11-04 RX ORDER — IBUPROFEN 200 MG
600 TABLET ORAL EVERY 6 HOURS
Refills: 0 | Status: COMPLETED | OUTPATIENT
Start: 2024-11-04 | End: 2025-10-03

## 2024-11-04 RX ADMIN — Medication 600 MILLIGRAM(S): at 18:00

## 2024-11-04 RX ADMIN — SIMETHICONE 80 MILLIGRAM(S): 80 TABLET, CHEWABLE ORAL at 14:04

## 2024-11-04 RX ADMIN — Medication 975 MILLIGRAM(S): at 14:30

## 2024-11-04 RX ADMIN — Medication 975 MILLIGRAM(S): at 08:26

## 2024-11-04 RX ADMIN — KETOROLAC TROMETHAMINE 30 MILLIGRAM(S): 30 INJECTION INTRAMUSCULAR; INTRAVENOUS at 11:13

## 2024-11-04 RX ADMIN — OXYCODONE HYDROCHLORIDE 5 MILLIGRAM(S): 30 TABLET ORAL at 21:18

## 2024-11-04 RX ADMIN — KETOROLAC TROMETHAMINE 30 MILLIGRAM(S): 30 INJECTION INTRAMUSCULAR; INTRAVENOUS at 00:06

## 2024-11-04 RX ADMIN — SIMETHICONE 80 MILLIGRAM(S): 80 TABLET, CHEWABLE ORAL at 05:40

## 2024-11-04 RX ADMIN — Medication 975 MILLIGRAM(S): at 15:15

## 2024-11-04 RX ADMIN — Medication 975 MILLIGRAM(S): at 02:54

## 2024-11-04 RX ADMIN — HEPARIN SODIUM 5000 UNIT(S): 10000 INJECTION INTRAVENOUS; SUBCUTANEOUS at 17:22

## 2024-11-04 RX ADMIN — KETOROLAC TROMETHAMINE 30 MILLIGRAM(S): 30 INJECTION INTRAMUSCULAR; INTRAVENOUS at 05:42

## 2024-11-04 RX ADMIN — SIMETHICONE 80 MILLIGRAM(S): 80 TABLET, CHEWABLE ORAL at 17:22

## 2024-11-04 RX ADMIN — Medication 975 MILLIGRAM(S): at 02:24

## 2024-11-04 RX ADMIN — Medication 975 MILLIGRAM(S): at 21:19

## 2024-11-04 RX ADMIN — Medication 600 MILLIGRAM(S): at 17:22

## 2024-11-04 RX ADMIN — KETOROLAC TROMETHAMINE 30 MILLIGRAM(S): 30 INJECTION INTRAMUSCULAR; INTRAVENOUS at 06:12

## 2024-11-04 RX ADMIN — OXYCODONE HYDROCHLORIDE 5 MILLIGRAM(S): 30 TABLET ORAL at 21:50

## 2024-11-04 RX ADMIN — Medication 600 MILLIGRAM(S): at 23:44

## 2024-11-04 RX ADMIN — HEPARIN SODIUM 5000 UNIT(S): 10000 INJECTION INTRAVENOUS; SUBCUTANEOUS at 06:29

## 2024-11-04 RX ADMIN — SIMETHICONE 80 MILLIGRAM(S): 80 TABLET, CHEWABLE ORAL at 23:43

## 2024-11-04 RX ADMIN — KETOROLAC TROMETHAMINE 30 MILLIGRAM(S): 30 INJECTION INTRAMUSCULAR; INTRAVENOUS at 11:30

## 2024-11-04 RX ADMIN — SIMETHICONE 80 MILLIGRAM(S): 80 TABLET, CHEWABLE ORAL at 08:26

## 2024-11-04 RX ADMIN — Medication 975 MILLIGRAM(S): at 21:50

## 2024-11-04 RX ADMIN — Medication 975 MILLIGRAM(S): at 09:00

## 2024-11-04 NOTE — CHART NOTE - NSCHARTNOTEFT_GEN_A_CORE
-- INCOMPLETE NOTE     Note delayed 2/2 clinical duties     24 @ 20:08    R1 Postpartum Evaluation Note    Called to bedside by RN to evaluate patient for " ".     Upon evaluation, patient is resting comfortably.    T(C): 36.7 (24 @ 18:24), Max: 37.3 (24 @ 23:30)  HR: 122 (24 @ 18:24) (91 - 122)  BP: 104/59 (24 @ 18:24) (93/56 - 104/59)  RR: 17 (24 @ 18:24) (15 - 18)  SpO2: 100% (24 @ 18:24) (99% - 100%)    Physical Exam:  Gen: NAD, resting comfortably in bed  Abd: non-distended, appropriately tender, hyperressonant to percussion of epigastrum,   dressing over incision clean and dry   vs  incision c/d/i, fundus firm    A/P: 39yyoF P_ POD#  PPD#  s/p uncomplicated rLTCS uncomplicated , QBL  , with       D/w attending Dr. A Sacks, PGY1 -- INCOMPLETE NOTE     Note delayed 2/2 clinical duties     24 @ 20:08    R1 Postpartum Evaluation Note    Called to bedside by RN to evaluate patient for " ".     Upon evaluation, patient is resting comfortably.    T(C): 36.7 (24 @ 18:24), Max: 37.3 (24 @ 23:30)  HR: 122 (24 @ 18:24) (91 - 122)  BP: 104/59 (24 @ 18:24) (93/56 - 104/59)  RR: 17 (24 @ 18:24) (15 - 18)  SpO2: 100% (24 @ 18:24) (99% - 100%)    Physical Exam:  Gen: NAD, resting comfortably in bed  Abd: non-distended, appropriately tender, hyperressonant to percussion of epigastrum,   dressing over incision clean and dry   vs  incision c/d/i, fundus firm    A/P: 39yyoF P_ POD#  PPD#  s/p uncomplicated rLTCS uncomplicated , QBL  , with       D/w attending Dr. Toles A Sacks, PGY1 Note delayed 2/2 clinical duties     24 @ 20:08    R1 Postpartum Evaluation Note    Evaluated patient at bedside due to resting tachycardia 122bpm. Patient reports not feeling     Upon evaluation, patient is resting comfortably.    T(C): 36.7 (24 @ 18:24), Max: 37.3 (24 @ 23:30)  HR: 122 (24 @ 18:24) (91 - 122)  BP: 104/59 (24 @ 18:24) (93/56 - 104/59)  RR: 17 (24 @ 18:24) (15 - 18)  SpO2: 100% (24 @ 18:24) (99% - 100%)    Physical Exam:  Gen: NAD, resting comfortably in bed  Abd: non-distended, appropriately tender, hyperressonant to percussion of epigastrum,   dressing over incision clean and dry   vs  incision c/d/i, fundus firm    A/P: 39yyoF P_ POD#  PPD#  s/p uncomplicated rLTCS uncomplicated , QBL  , with       D/w attending Dr. Toles A Sacks, PGY1 Note delayed 2/2 clinical duties     11-04-24 @ 20:08    R1 Postpartum Evaluation Note    Evaluated patient at bedside due to resting tachycardia 122bpm. Upon evaluation, patient is resting comfortably. Patient denies CP/SOB/Palpitations. She reports she had just gone to the bathroom and walked in the room before they took her vitals showing . She endorses moderate pain in RLQ and LLQ of abdomen that improves with Tylenol/Motrin, plans to take first dose of oxycodone after she eats dinner/before bed. Reports feeling otherwise well. Denies light headedness/dizziness. Denies headaches/changes to vision/RUQ pain.     T(C): 36.7 (11-04-24 @ 18:24), Max: 37.3 (11-03-24 @ 23:30)  HR: 122 (11-04-24 @ 18:24) (91 - 122)  BP: 104/59 (11-04-24 @ 18:24) (93/56 - 104/59)  RR: 17 (11-04-24 @ 18:24) (15 - 18)  SpO2: 100% (11-04-24 @ 18:24) (99% - 100%)    Vital Signs Last 24 Hrs  T(C): 37.6 (04 Nov 2024 19:45), Max: 37.6 (04 Nov 2024 19:45)  T(F): 99.7 (04 Nov 2024 19:45), Max: 99.7 (04 Nov 2024 19:45)  HR: 112 (04 Nov 2024 19:45) (91 - 122)  BP: 92/47 (04 Nov 2024 19:45) (92/47 - 104/59)  BP(mean): 77 (03 Nov 2024 22:00) (77 - 77)  RR: 18 (04 Nov 2024 19:45) (17 - 18)  SpO2: 100% (04 Nov 2024 19:45) (99% - 100%)    Parameters below as of 04 Nov 2024 19:45  Patient On (Oxygen Delivery Method): room air    Physical Exam:  Gen: NAD, resting comfortably in bed  Abd: non-distended, appropriately tender, soft, fundus firm   Incision: c/d/i, steri strips in place  Pelvic: lochia wnl    Repeat VS while still present in room shows , BP 92/47. Patient asymptomatic.       A/P: 39yyoF P4 POD#1 s/p pLTCS for breech presentation. QBL: 877   Hct: 34.3->27.6->1u pRBC->29.3->26.7->22.2->22.7.   - H/H low end of normal range, but stable   - no intervention at this time   - pt instructed to increase PO intake and alert RN if she becomes symptomatic   - RN instructed to alert MD if pt becomes symptomatic or they have any concerns     D/w attending Dr. Toles A Sacks, PGY1

## 2024-11-04 NOTE — PROGRESS NOTE ADULT - SUBJECTIVE AND OBJECTIVE BOX
OB Progress Note:  Delivery, POD#1    S: 38yo POD#1 s/p LTCS for breech position c/b PTD @ 36w6d . Her pain is well controlled. Overnight, patient reported feeling unwell, dizziness, and RUQ/epigastric pain. Patient reports this AM that her RUQ/epigastric pain is resolved. She denies dizziness at this time. She continues to endorse bloating. She reports 2 episodes of emesis overnight, describes it as undigested food. She reports improvement in nausea after Zofran. Currently tolerating clear liquids, reports that she will plan to eat breakfast this AM. Has yet to pass gas. Currently denies CP/SOB/lightheadedness/dizziness. She is ambulating without difficulty. Voiding spontaneously.     O:   Vital Signs Last 24 Hrs  T(C): 36.7 (2024 05:35), Max: 37.3 (2024 23:30)  T(F): 98 (2024 05:35), Max: 99.1 (2024 23:30)  HR: 91 (2024 05:35) (61 - 127)  BP: 93/56 (2024 05:35) (72/51 - 123/105)  BP(mean): 77 (2024 22:00) (59 - 112)  RR: 18 (2024 05:35) (9 - 29)  SpO2: 99% (2024 05:35) (92% - 100%)    Parameters below as of 2024 05:35  Patient On (Oxygen Delivery Method): room air        Labs:  Blood type: A Positive  Rubella IgG: RPR: Negative                          7.7[L]   18.64[H] >-----------< 224    (  @ 04:12 )             22.2[L]                        8.9[L]   22.34[H] >-----------< 245    (  @ 20:50 )             26.7[L]                        10.0[L]   29.93[H] >-----------< 252    (  @ 17:11 )             29.3[L]                        9.3[L]   21.35[H] >-----------< 232    (  @ 10:11 )             27.6[L]                        11.8   16.39[H] >-----------< 248    (  @ 07:05 )             34.3[L]    24 @ 04:12      130[L]  |  99  |  8   ----------------------------<  95  4.3   |  18[L]  |  0.87    24 @ 20:50      134[L]  |  102  |  7   ----------------------------<  98  5.2   |  21[L]  |  0.86    24 @ 10:11      135  |  105  |  7   ----------------------------<  121[H]  4.0   |  21[L]  |  0.80        Ca    8.3[L]      2024 04:12  Ca    8.4      2024 20:50  Ca    8.1[L]      2024 10:11    TPro  4.8[L]  /  Alb  2.5[L]  /  TBili  0.4  /  DBili  x   /  AST  36[H]  /  ALT  47[H]  /  AlkPhos  103  24 @ 04:12  TPro  4.8[L]  /  Alb  2.6[L]  /  TBili  0.6  /  DBili  x   /  AST  39[H]  /  ALT  51[H]  /  AlkPhos  114  24 @ 20:50  TPro  4.4[L]  /  Alb  2.3[L]  /  TBili  0.3  /  DBili  x   /  AST  34[H]  /  ALT  44[H]  /  AlkPhos  104  24 @ 10:11          PE:  General: NAD  Abdomen: Mildly distended, appropriately tender to palpation, RUQ nontender, incision c/d/i.  Extremities: No erythema, no pitting edema  Vaginal: Lochia WNL on pad below.

## 2024-11-04 NOTE — PROVIDER CONTACT NOTE (OTHER) - ASSESSMENT
see flow sheet  for vital signs , incision clean & Intact , minimal vaginal bleeding , voiding , abdomen  soft  no c/o dizziness , SOB  offered, Breast pumping , Visiting Infant In NICU ,

## 2024-11-05 ENCOUNTER — APPOINTMENT (OUTPATIENT)
Dept: ANTEPARTUM | Facility: CLINIC | Age: 39
End: 2024-11-05

## 2024-11-05 LAB
ALBUMIN SERPL ELPH-MCNC: 2.7 G/DL — LOW (ref 3.3–5)
ALP SERPL-CCNC: 102 U/L — SIGNIFICANT CHANGE UP (ref 40–120)
ALT FLD-CCNC: 36 U/L — HIGH (ref 4–33)
AMYLASE P1 CFR SERPL: 153 U/L — HIGH (ref 25–125)
ANION GAP SERPL CALC-SCNC: 9 MMOL/L — SIGNIFICANT CHANGE UP (ref 7–14)
AST SERPL-CCNC: 23 U/L — SIGNIFICANT CHANGE UP (ref 4–32)
BILIRUB SERPL-MCNC: 0.2 MG/DL — SIGNIFICANT CHANGE UP (ref 0.2–1.2)
BUN SERPL-MCNC: 8 MG/DL — SIGNIFICANT CHANGE UP (ref 7–23)
CALCIUM SERPL-MCNC: 8.2 MG/DL — LOW (ref 8.4–10.5)
CHLORIDE SERPL-SCNC: 108 MMOL/L — HIGH (ref 98–107)
CO2 SERPL-SCNC: 22 MMOL/L — SIGNIFICANT CHANGE UP (ref 22–31)
CREAT SERPL-MCNC: 0.79 MG/DL — SIGNIFICANT CHANGE UP (ref 0.5–1.3)
EGFR: 98 ML/MIN/1.73M2 — SIGNIFICANT CHANGE UP
GLUCOSE SERPL-MCNC: 99 MG/DL — SIGNIFICANT CHANGE UP (ref 70–99)
HCT VFR BLD CALC: 19.2 % — CRITICAL LOW (ref 34.5–45)
HCT VFR BLD CALC: 21.4 % — LOW (ref 34.5–45)
HGB BLD-MCNC: 6.4 G/DL — CRITICAL LOW (ref 11.5–15.5)
HGB BLD-MCNC: 7.1 G/DL — LOW (ref 11.5–15.5)
LIDOCAIN IGE QN: 27 U/L — SIGNIFICANT CHANGE UP (ref 7–60)
MCHC RBC-ENTMCNC: 28.8 PG — SIGNIFICANT CHANGE UP (ref 27–34)
MCHC RBC-ENTMCNC: 29.2 PG — SIGNIFICANT CHANGE UP (ref 27–34)
MCHC RBC-ENTMCNC: 33.2 G/DL — SIGNIFICANT CHANGE UP (ref 32–36)
MCHC RBC-ENTMCNC: 33.3 G/DL — SIGNIFICANT CHANGE UP (ref 32–36)
MCV RBC AUTO: 86.5 FL — SIGNIFICANT CHANGE UP (ref 80–100)
MCV RBC AUTO: 88.1 FL — SIGNIFICANT CHANGE UP (ref 80–100)
NRBC # BLD: 0 /100 WBCS — SIGNIFICANT CHANGE UP (ref 0–0)
NRBC # BLD: 0 /100 WBCS — SIGNIFICANT CHANGE UP (ref 0–0)
NRBC # FLD: 0 K/UL — SIGNIFICANT CHANGE UP (ref 0–0)
NRBC # FLD: 0 K/UL — SIGNIFICANT CHANGE UP (ref 0–0)
PLATELET # BLD AUTO: 226 K/UL — SIGNIFICANT CHANGE UP (ref 150–400)
PLATELET # BLD AUTO: 258 K/UL — SIGNIFICANT CHANGE UP (ref 150–400)
POTASSIUM SERPL-MCNC: 4.2 MMOL/L — SIGNIFICANT CHANGE UP (ref 3.5–5.3)
POTASSIUM SERPL-SCNC: 4.2 MMOL/L — SIGNIFICANT CHANGE UP (ref 3.5–5.3)
PROT SERPL-MCNC: 5.6 G/DL — LOW (ref 6–8.3)
RBC # BLD: 2.22 M/UL — LOW (ref 3.8–5.2)
RBC # BLD: 2.43 M/UL — LOW (ref 3.8–5.2)
RBC # FLD: 13.5 % — SIGNIFICANT CHANGE UP (ref 10.3–14.5)
RBC # FLD: 13.6 % — SIGNIFICANT CHANGE UP (ref 10.3–14.5)
SODIUM SERPL-SCNC: 139 MMOL/L — SIGNIFICANT CHANGE UP (ref 135–145)
WBC # BLD: 14.78 K/UL — HIGH (ref 3.8–10.5)
WBC # BLD: 17.09 K/UL — HIGH (ref 3.8–10.5)
WBC # FLD AUTO: 14.78 K/UL — HIGH (ref 3.8–10.5)
WBC # FLD AUTO: 17.09 K/UL — HIGH (ref 3.8–10.5)

## 2024-11-05 RX ORDER — FERROUS SULFATE 325(65) MG
325 TABLET ORAL THREE TIMES A DAY
Refills: 0 | Status: DISCONTINUED | OUTPATIENT
Start: 2024-11-05 | End: 2024-11-07

## 2024-11-05 RX ORDER — SENNA 187 MG
1 TABLET ORAL
Refills: 0 | Status: DISCONTINUED | OUTPATIENT
Start: 2024-11-05 | End: 2024-11-07

## 2024-11-05 RX ORDER — ASCORBIC ACID 500 MG
500 TABLET ORAL DAILY
Refills: 0 | Status: DISCONTINUED | OUTPATIENT
Start: 2024-11-05 | End: 2024-11-07

## 2024-11-05 RX ADMIN — Medication 975 MILLIGRAM(S): at 09:30

## 2024-11-05 RX ADMIN — Medication 325 MILLIGRAM(S): at 21:48

## 2024-11-05 RX ADMIN — Medication 975 MILLIGRAM(S): at 21:37

## 2024-11-05 RX ADMIN — Medication 975 MILLIGRAM(S): at 22:07

## 2024-11-05 RX ADMIN — Medication 975 MILLIGRAM(S): at 15:10

## 2024-11-05 RX ADMIN — HEPARIN SODIUM 5000 UNIT(S): 10000 INJECTION INTRAVENOUS; SUBCUTANEOUS at 17:21

## 2024-11-05 RX ADMIN — SIMETHICONE 80 MILLIGRAM(S): 80 TABLET, CHEWABLE ORAL at 11:45

## 2024-11-05 RX ADMIN — Medication 600 MILLIGRAM(S): at 17:21

## 2024-11-05 RX ADMIN — MAGNESIUM HYDROXIDE 30 MILLILITER(S): 1200 SUSPENSION ORAL at 11:46

## 2024-11-05 RX ADMIN — Medication 600 MILLIGRAM(S): at 00:15

## 2024-11-05 RX ADMIN — Medication 600 MILLIGRAM(S): at 06:32

## 2024-11-05 RX ADMIN — SIMETHICONE 80 MILLIGRAM(S): 80 TABLET, CHEWABLE ORAL at 06:32

## 2024-11-05 RX ADMIN — Medication 500 MILLIGRAM(S): at 11:45

## 2024-11-05 RX ADMIN — HEPARIN SODIUM 5000 UNIT(S): 10000 INJECTION INTRAVENOUS; SUBCUTANEOUS at 06:32

## 2024-11-05 RX ADMIN — Medication 600 MILLIGRAM(S): at 12:40

## 2024-11-05 RX ADMIN — Medication 975 MILLIGRAM(S): at 08:54

## 2024-11-05 RX ADMIN — Medication 600 MILLIGRAM(S): at 07:10

## 2024-11-05 RX ADMIN — Medication 325 MILLIGRAM(S): at 11:45

## 2024-11-05 RX ADMIN — Medication 600 MILLIGRAM(S): at 18:20

## 2024-11-05 RX ADMIN — Medication 975 MILLIGRAM(S): at 16:00

## 2024-11-05 RX ADMIN — SIMETHICONE 80 MILLIGRAM(S): 80 TABLET, CHEWABLE ORAL at 17:22

## 2024-11-05 RX ADMIN — Medication 600 MILLIGRAM(S): at 11:44

## 2024-11-05 NOTE — PROVIDER CONTACT NOTE (CRITICAL VALUE NOTIFICATION) - SITUATION
Mom stated that the black part from the silver nitrate fell off and it is still a little red. Mom is told to keep an eye on it and if does not get better, will need to be seen again.  
CBC   from today  Hb 6.4/ Hct 19.2

## 2024-11-05 NOTE — PROGRESS NOTE ADULT - ASSESSMENT
A/P: 38yo POD#1 s/p LTCS for breech position c/b PTD @ 36w6d. QBL: 877ccs. Patient is stable and doing well post-operatively.    #dizziness  -s/p 1u pRBCs - Hematocrit: 34.3->27.6->1pRBC-> 29.3->26.7->22.2  -Patient reports improvement, currently denies dizziness.  -Repeat CBC stat.    #Elevated LFTs and Amylase  -mildly elevated LFTs AST/ALT:33/44->39/51->36/47  -Amylase: 660->506  -Obtain CT A/P to further evaluate  -Obtain repeat CMP/Amlyase/Lipase STAT  -Patient asymptomatic this AM, denies any RUQ pain.  -BPs: /50-70s overnight  -Continue to monitor    #PP  - Continue regular diet.  - Increase ambulation.  - Continue HSQ and venodynes in bed for DVT prophylaxis.  - Continue motrin, tylenol, oxycodone PRN for pain control.    D/W Dr. Reynold Saha MD PGY1
A/P: 38yo POD#2 s/p s/p LTCS for breech position c/b PTD @ 36w6d. QBL: 877ccs.  Patient is stable and doing well post-operatively.      #Acute blood loss anemia  -s/p 1u pRBCs - Hematocrit: 34.3->27.6->1pRBC-> 29.3->26.7->22.2->22.7->19.2  -CT A/P (11/4): Mild slightly hyperattenuating fluid in the peritoneal cavity, compatible with minimal hemoperitoneum. No evidence of active hemorrhage.  -Currently asymptomatic, dizziness on POD#0 resolved per pt.  -Episodes of tachycardia overnight - per pt, VS taken right after walking, when in pain  -Monitor VS and symptoms  -Repeat CBC at 12pm.     #Elevated LFTs and Amylase  -mildly elevated LFTs AST/ALT:33/44->39/51->36/47  -Amylase: 660->506->521  -CT A/P (11/4): Mild slightly hyperattenuating fluid in the peritoneal cavity, compatible with minimal hemoperitoneum. No evidence of active hemorrhage.  -Patient asymptomatic this AM, denies any RUQ pain.  -BPs overnight WNL.  -Continue to monitor    #PP  - Continue regular diet.  - Increase ambulation as tolerated  - Continue HSQ and venodynes in bed for DVT prophylaxis.  - Continue motrin, tylenol, oxycodone PRN for pain control.    D/W Dr. Maryann Saha MD PGY1

## 2024-11-05 NOTE — PROVIDER CONTACT NOTE (CRITICAL VALUE NOTIFICATION) - ASSESSMENT
V/S  T 98.3 ,   ,/56  RESP 16  ,O2sat 100% room air , Pt denies chest Pain , SOB ,Dizziness ,Incision clean & Intact  , minimal vaginal bleeding , Abdomen soft , positive bowel sounds , lungs clear  no calf tenderness

## 2024-11-05 NOTE — PROGRESS NOTE ADULT - SUBJECTIVE AND OBJECTIVE BOX
OB Progress Note: LTCS, POD#2    S: 38yo POD#2 s/p LTCS for breech position c/b PTD @ 36w6d. Pain is well controlled with current pain regimen She is tolerating a regular diet. Has yet to pass flatus. Endorses some gas pain. She is voiding spontaneously, and ambulating without difficulty. Denies CP/SOB. Denies lightheadedness/dizziness. Denies N/V.    O:  Vitals:  Vital Signs Last 24 Hrs  T(C): 36.8 (05 Nov 2024 08:25), Max: 37.6 (04 Nov 2024 19:45)  T(F): 98.3 (05 Nov 2024 08:25), Max: 99.7 (04 Nov 2024 19:45)  HR: 109 (05 Nov 2024 08:25) (93 - 122)  BP: 106/56 (05 Nov 2024 08:25) (92/47 - 106/56)  BP(mean): --  RR: 16 (05 Nov 2024 08:25) (16 - 18)  SpO2: 100% (05 Nov 2024 08:25) (98% - 100%)    Parameters below as of 05 Nov 2024 08:25  Patient On (Oxygen Delivery Method): room air        MEDICATIONS  (STANDING):  acetaminophen     Tablet .. 975 milliGRAM(s) Oral <User Schedule>  diphtheria/tetanus/pertussis (acellular) Vaccine (Adacel) 0.5 milliLiter(s) IntraMuscular once  heparin   Injectable 5000 Unit(s) SubCutaneous every 12 hours  ibuprofen  Tablet. 600 milliGRAM(s) Oral every 6 hours  simethicone 80 milliGRAM(s) Chew every 6 hours      MEDICATIONS  (PRN):  albuterol    90 MICROgram(s) HFA Inhaler 2 Puff(s) Inhalation every 6 hours PRN Shortness of Breath and/or Wheezing  diphenhydrAMINE 25 milliGRAM(s) Oral every 6 hours PRN Pruritus  lanolin Ointment 1 Application(s) Topical every 6 hours PRN Sore Nipples  magnesium hydroxide Suspension 30 milliLiter(s) Oral two times a day PRN Constipation  oxyCODONE    IR 5 milliGRAM(s) Oral every 3 hours PRN Moderate to Severe Pain (4-10)  simethicone 80 milliGRAM(s) Chew every 4 hours PRN Gas      Labs:  Blood type: A Positive  Rubella IgG: RPR: Negative                          6.4[LL]   14.78[H] >-----------< 226    ( 11-05 @ 05:50 )             19.2[LL]                        7.6[L]   16.63[H] >-----------< 250    ( 11-04 @ 18:16 )             22.0[L]                        7.6[L]   17.61[H] >-----------< 232    ( 11-04 @ 08:45 )             22.7[L]                        7.7[L]   18.64[H] >-----------< 224    ( 11-04 @ 04:12 )             22.2[L]                        8.9[L]   22.34[H] >-----------< 245    ( 11-03 @ 20:50 )             26.7[L]                        10.0[L]   29.93[H] >-----------< 252    ( 11-03 @ 17:11 )             29.3[L]                        9.3[L]   21.35[H] >-----------< 232    ( 11-03 @ 10:11 )             27.6[L]                        11.8   16.39[H] >-----------< 248    ( 11-03 @ 07:05 )             34.3[L]    11-04-24 @ 08:45      133[L]  |  100  |  9   ----------------------------<  86  4.1   |  21[L]  |  0.88    11-04-24 @ 04:12      130[L]  |  99  |  8   ----------------------------<  95  4.3   |  18[L]  |  0.87    11-03-24 @ 20:50      134[L]  |  102  |  7   ----------------------------<  98  5.2   |  21[L]  |  0.86    11-03-24 @ 10:11      135  |  105  |  7   ----------------------------<  121[H]  4.0   |  21[L]  |  0.80        Ca    8.5      04 Nov 2024 08:45  Ca    8.3[L]      04 Nov 2024 04:12  Ca    8.4      03 Nov 2024 20:50  Ca    8.1[L]      03 Nov 2024 10:11    TPro  4.9[L]  /  Alb  2.7[L]  /  TBili  0.3  /  DBili  x   /  AST  36[H]  /  ALT  48[H]  /  AlkPhos  104  11-04-24 @ 08:45  TPro  4.8[L]  /  Alb  2.5[L]  /  TBili  0.4  /  DBili  x   /  AST  36[H]  /  ALT  47[H]  /  AlkPhos  103  11-04-24 @ 04:12  TPro  4.8[L]  /  Alb  2.6[L]  /  TBili  0.6  /  DBili  x   /  AST  39[H]  /  ALT  51[H]  /  AlkPhos  114  11-03-24 @ 20:50  TPro  4.4[L]  /  Alb  2.3[L]  /  TBili  0.3  /  DBili  x   /  AST  34[H]  /  ALT  44[H]  /  AlkPhos  104  11-03-24 @ 10:11          PE:  General: NAD  Abdomen: Soft, mildly distended, appropriately tender, normoactive bowel sounds, incision c/d/i.  Pelvic: normal lochia on pad below.  Extremities: No erythema, no pitting edema

## 2024-11-06 RX ORDER — OXYCODONE HYDROCHLORIDE 30 MG/1
5 TABLET ORAL
Refills: 0 | Status: DISCONTINUED | OUTPATIENT
Start: 2024-11-06 | End: 2024-11-07

## 2024-11-06 RX ADMIN — Medication 600 MILLIGRAM(S): at 06:07

## 2024-11-06 RX ADMIN — Medication 600 MILLIGRAM(S): at 13:58

## 2024-11-06 RX ADMIN — Medication 600 MILLIGRAM(S): at 18:53

## 2024-11-06 RX ADMIN — Medication 600 MILLIGRAM(S): at 12:58

## 2024-11-06 RX ADMIN — Medication 975 MILLIGRAM(S): at 08:52

## 2024-11-06 RX ADMIN — Medication 325 MILLIGRAM(S): at 06:07

## 2024-11-06 RX ADMIN — SIMETHICONE 80 MILLIGRAM(S): 80 TABLET, CHEWABLE ORAL at 06:07

## 2024-11-06 RX ADMIN — SIMETHICONE 80 MILLIGRAM(S): 80 TABLET, CHEWABLE ORAL at 00:46

## 2024-11-06 RX ADMIN — Medication 325 MILLIGRAM(S): at 21:21

## 2024-11-06 RX ADMIN — MAGNESIUM HYDROXIDE 30 MILLILITER(S): 1200 SUSPENSION ORAL at 12:58

## 2024-11-06 RX ADMIN — Medication 975 MILLIGRAM(S): at 09:52

## 2024-11-06 RX ADMIN — SIMETHICONE 80 MILLIGRAM(S): 80 TABLET, CHEWABLE ORAL at 23:51

## 2024-11-06 RX ADMIN — Medication 600 MILLIGRAM(S): at 23:50

## 2024-11-06 RX ADMIN — HEPARIN SODIUM 5000 UNIT(S): 10000 INJECTION INTRAVENOUS; SUBCUTANEOUS at 06:08

## 2024-11-06 RX ADMIN — Medication 975 MILLIGRAM(S): at 20:48

## 2024-11-06 RX ADMIN — Medication 600 MILLIGRAM(S): at 18:00

## 2024-11-06 RX ADMIN — Medication 600 MILLIGRAM(S): at 06:37

## 2024-11-06 RX ADMIN — Medication 975 MILLIGRAM(S): at 21:52

## 2024-11-06 RX ADMIN — OXYCODONE HYDROCHLORIDE 5 MILLIGRAM(S): 30 TABLET ORAL at 00:46

## 2024-11-06 RX ADMIN — OXYCODONE HYDROCHLORIDE 5 MILLIGRAM(S): 30 TABLET ORAL at 23:50

## 2024-11-06 RX ADMIN — OXYCODONE HYDROCHLORIDE 5 MILLIGRAM(S): 30 TABLET ORAL at 01:16

## 2024-11-06 NOTE — PROGRESS NOTE ADULT - SUBJECTIVE AND OBJECTIVE BOX
SUBJECTIVE:    Pain: Controlled    Complaints: None    MILESTONES:    Alert and Oriented x 3  [ x ]  Out of bed/ ambulating. [ x ]  Flatus:   Positive [ x ]  Negative [  ]  Bowel movement  [  ] Positive [  ] Negative   Voiding [x  ] Due to void [  ]   Ferguson/Indwelling catheter in place [  ]  Diet: Regular [ x ]  Clears [  ]  NPO [  ]    Infant feeding:  Breast [  ]   Bottle [  ]  Both [ X ]  Feeding related issues and/or concerns:      OBJECTIVE:  T(C): 36.5 (24 @ 06:00), Max: 36.6 (24 @ 17:54)  HR: 98 (24 @ 06:00) (98 - 104)  BP: 99/58 (24 @ 06:00) (97/58 - 107/55)  RR: 18 (24 @ 06:00) (18 - 18)  SpO2: 99% (24 @ 06:00) (98% - 100%)  Wt(kg): --                        7.1    17.09 )-----------( 258      ( 2024 11:35 )             21.4           Blood Type: A Positive    RPR: Negative          MEDICATIONS  (STANDING):  acetaminophen     Tablet .. 975 milliGRAM(s) Oral <User Schedule>  ascorbic acid 500 milliGRAM(s) Oral daily  diphtheria/tetanus/pertussis (acellular) Vaccine (Adacel) 0.5 milliLiter(s) IntraMuscular once  ferrous    sulfate 325 milliGRAM(s) Oral three times a day  heparin   Injectable 5000 Unit(s) SubCutaneous every 12 hours  ibuprofen  Tablet. 600 milliGRAM(s) Oral every 6 hours  simethicone 80 milliGRAM(s) Chew every 6 hours    MEDICATIONS  (PRN):  albuterol    90 MICROgram(s) HFA Inhaler 2 Puff(s) Inhalation every 6 hours PRN Shortness of Breath and/or Wheezing  diphenhydrAMINE 25 milliGRAM(s) Oral every 6 hours PRN Pruritus  lanolin Ointment 1 Application(s) Topical every 6 hours PRN Sore Nipples  magnesium hydroxide Suspension 30 milliLiter(s) Oral two times a day PRN Constipation  oxyCODONE    IR 5 milliGRAM(s) Oral every 3 hours PRN Moderate to Severe Pain (4-10)  senna 1 Tablet(s) Oral two times a day PRN Constipation  simethicone 80 milliGRAM(s) Chew every 4 hours PRN Gas        ASSESSMENT:    39y     G  4    P   3104      PO Day#  3        Delivery: Primary [ X  ]    Repeat [  ]       QBL - 877                                  Indication of procedure: Breech, in Labor with Ruptured Membranes    Condition: Stable    Past Medical History significant for: HPI:  38y/o  @36.6wks gestation presents with leaking of fluid at 0600am, yellow tinged fluid, verbalizes occasional contraction pain 4/10 on pain scale.  Patient has a history of polyhydramnios in this pregnancy recent CANDY on 2024 48.71.  Fetal alert for Duodenal Atresia.  Patient denies vaginal bleeding, headache, chest pain, epigastric pain, blurred vision, SOB.  Endorse +FM     PNC: Dr. Doran, high risk   -Severe Polyhydramios   -10/11/2024 S/P Fetal MRI: Proximal duodenal obstruction  -[facility] sonogram 2024 Cephalic, anterior placenta, CANDY 48.71cm, BPP 8/8   (2024 07:01)      Current Issues:    Breasts:  Soft [x  ]   Engorged [  ]  Nipples:  Abdomen: Soft [ x ]   Distended [  ] Nontender [  ]     Bowel sounds :  Present [  ]  Absent [  ]   Fundus:  Firm [x  ]  Boggy [  ]    Abdominal incision: Clean, Dry and Intact [x  ]  Staples [  ] Steri Strips [ X ] Dermabond [  ] Sutures [  ]    Patient wearing abdominal binder for support.    Vaginal: Lochia:  Heavy [  ]  Moderate [ x ]   Scant [  ]    Extremities: Edema [ X ] Negative Francisco's Sign [X  ] Nontender Best  [ x ] Positive pedal pulses [  ]    Other relevant physical exam findings:  H & H - Low but stable.   6.4/19.2 --->   7.1/21.4   Offers no complaints of dizziness, Lightheadedness, SOB or Palpitations.      PLAN:    Plan: Increase ambulation, analgesia PRN and pain medication protocol standing oxycodone, ibuprofen and acetaminophen.    Diet: Regular diet    Continue routine post-operative and postpartum care.     Discharge Planning [ x ]    For Discharge Tomorrow  [  X  ]    Consults:  Social Work [  ]  Lactation [ x ]  Other [         ]

## 2024-11-06 NOTE — PROGRESS NOTE ADULT - SUBJECTIVE AND OBJECTIVE BOX
This patient was in the NICU visiting her baby during rounds this morning.  VSS, Afebrile  Will check back later  Continue routine Post Op Care

## 2024-11-07 VITALS
OXYGEN SATURATION: 99 % | HEART RATE: 90 BPM | TEMPERATURE: 98 F | DIASTOLIC BLOOD PRESSURE: 66 MMHG | RESPIRATION RATE: 18 BRPM | SYSTOLIC BLOOD PRESSURE: 101 MMHG

## 2024-11-07 RX ORDER — ACETAMINOPHEN 500 MG
3 TABLET ORAL
Qty: 0 | Refills: 0 | DISCHARGE
Start: 2024-11-07

## 2024-11-07 RX ORDER — IBUPROFEN 200 MG
1 TABLET ORAL
Qty: 0 | Refills: 0 | DISCHARGE

## 2024-11-07 RX ADMIN — Medication 600 MILLIGRAM(S): at 00:53

## 2024-11-07 RX ADMIN — Medication 325 MILLIGRAM(S): at 06:16

## 2024-11-07 RX ADMIN — SIMETHICONE 80 MILLIGRAM(S): 80 TABLET, CHEWABLE ORAL at 12:33

## 2024-11-07 RX ADMIN — Medication 600 MILLIGRAM(S): at 06:57

## 2024-11-07 RX ADMIN — Medication 325 MILLIGRAM(S): at 12:33

## 2024-11-07 RX ADMIN — Medication 600 MILLIGRAM(S): at 11:58

## 2024-11-07 RX ADMIN — Medication 975 MILLIGRAM(S): at 09:11

## 2024-11-07 RX ADMIN — Medication 600 MILLIGRAM(S): at 12:33

## 2024-11-07 RX ADMIN — OXYCODONE HYDROCHLORIDE 5 MILLIGRAM(S): 30 TABLET ORAL at 00:53

## 2024-11-07 RX ADMIN — SIMETHICONE 80 MILLIGRAM(S): 80 TABLET, CHEWABLE ORAL at 06:16

## 2024-11-07 RX ADMIN — Medication 975 MILLIGRAM(S): at 09:55

## 2024-11-07 RX ADMIN — Medication 500 MILLIGRAM(S): at 11:58

## 2024-11-07 RX ADMIN — Medication 600 MILLIGRAM(S): at 06:15

## 2024-11-07 NOTE — PROGRESS NOTE ADULT - SUBJECTIVE AND OBJECTIVE BOX
OB Postpartum Note: Primary  Delivery, POD#4    S: 38yo now  POD#4 s/p pLTCS in setting of PPROM.  Pt seen and examine at bedside no acute events overnight The patient feels well.  Pain is well controlled. She is tolerating a regular diet and passing flatus. She is voiding spontaneously, and ambulating without difficulty. Denies CP/SOB. Denies lightheadedness/dizziness. Denies N/V.    O:  Vitals:  Vital Signs Last 24 Hrs  T(C): 36.5 (2024 05:47), Max: 36.7 (2024 18:11)  T(F): 97.7 (2024 05:47), Max: 98.1 (2024 18:11)  HR: 82 (2024 05:47) (82 - 93)  BP: 108/62 (2024 05:47) (107/66 - 108/62)  BP(mean): --  RR: 18 (2024 05:47) (18 - 18)  SpO2: 99% (2024 05:47) (98% - 99%)    Parameters below as of 2024 05:47  Patient On (Oxygen Delivery Method): room air        MEDICATIONS  (STANDING):  acetaminophen     Tablet .. 975 milliGRAM(s) Oral <User Schedule>  ascorbic acid 500 milliGRAM(s) Oral daily  diphtheria/tetanus/pertussis (acellular) Vaccine (Adacel) 0.5 milliLiter(s) IntraMuscular once  ferrous    sulfate 325 milliGRAM(s) Oral three times a day  heparin   Injectable 5000 Unit(s) SubCutaneous every 12 hours  ibuprofen  Tablet. 600 milliGRAM(s) Oral every 6 hours  simethicone 80 milliGRAM(s) Chew every 6 hours    MEDICATIONS  (PRN):  albuterol    90 MICROgram(s) HFA Inhaler 2 Puff(s) Inhalation every 6 hours PRN Shortness of Breath and/or Wheezing  diphenhydrAMINE 25 milliGRAM(s) Oral every 6 hours PRN Pruritus  lanolin Ointment 1 Application(s) Topical every 6 hours PRN Sore Nipples  magnesium hydroxide Suspension 30 milliLiter(s) Oral two times a day PRN Constipation  oxyCODONE    IR 5 milliGRAM(s) Oral every 3 hours PRN Moderate to Severe Pain (4-10)  senna 1 Tablet(s) Oral two times a day PRN Constipation  simethicone 80 milliGRAM(s) Chew every 4 hours PRN Gas      LABS:  Blood type: A Positive  Rubella IgG: RPR: Negative                          7.1[L]   17.09[H] >-----------< 258    (  @ 11:35 )             21.4[L]                        6.4[LL]   14.78[H] >-----------< 226    (  @ 05:50 )             19.2[LL]                        7.6[L]   16.63[H] >-----------< 250    (  @ 18:16 )             22.0[L]    24 @ 12:30      139  |  108[H]  |  8   ----------------------------<  99  4.2   |  22  |  0.79        Ca    8.2[L]      2024 12:30    TPro  5.6[L]  /  Alb  2.7[L]  /  TBili  0.2  /  DBili  x   /  AST  23  /  ALT  36[H]  /  AlkPhos  102  24 @ 12:30          Physical exam:  Gen: NAD  Lungs: CTA b/l good airway entry  CVS: RRR  S1S2  Abdomen: Soft, nontender, no distension , firm uterine fundus at umbilicus.  Incision: Clean, dry, and intact   Pelvic: Normal lochia noted  Ext: No calf tenderness, no erythema no edema, pedal pulse 2+    A/P: 38yo now  G  P POD#3 s/p pLTCS.  Patient is stable and is doing well post-operatively.  - Continue routine Postop/ PP care  - Continue motrin, tylenol, oxycodone PRN for pain control.  - Increase ambulation  - Continue regular diet  -Review PEC precaution in detail pt verbalized understanding  - Discharge planning  - f/u in MD office 1-2 wks incision check    Marlys Guthrie NP     OB Postpartum Note: Primary  Delivery, POD#4    S: 38yo now  POD#4 s/p pLTCS in setting of PPROM at 36.6 weeks, breech presentation.  mL. Pt seen and examine at bedside no acute events overnight The patient feels well.  Pain is well controlled. She is tolerating a regular diet and passing flatus. She is voiding spontaneously, and ambulating without difficulty. Denies CP/SOB. Denies lightheadedness/dizziness. Denies N/V.    O:  Vitals:  Vital Signs Last 24 Hrs  T(C): 36.5 (2024 05:47), Max: 36.7 (2024 18:11)  T(F): 97.7 (2024 05:47), Max: 98.1 (2024 18:11)  HR: 82 (2024 05:47) (82 - 93)  BP: 108/62 (2024 05:47) (107/66 - 108/62)  BP(mean): --  RR: 18 (2024 05:47) (18 - 18)  SpO2: 99% (2024 05:47) (98% - 99%)    Parameters below as of 2024 05:47  Patient On (Oxygen Delivery Method): room air        MEDICATIONS  (STANDING):  acetaminophen     Tablet .. 975 milliGRAM(s) Oral <User Schedule>  ascorbic acid 500 milliGRAM(s) Oral daily  diphtheria/tetanus/pertussis (acellular) Vaccine (Adacel) 0.5 milliLiter(s) IntraMuscular once  ferrous    sulfate 325 milliGRAM(s) Oral three times a day  heparin   Injectable 5000 Unit(s) SubCutaneous every 12 hours  ibuprofen  Tablet. 600 milliGRAM(s) Oral every 6 hours  simethicone 80 milliGRAM(s) Chew every 6 hours    MEDICATIONS  (PRN):  albuterol    90 MICROgram(s) HFA Inhaler 2 Puff(s) Inhalation every 6 hours PRN Shortness of Breath and/or Wheezing  diphenhydrAMINE 25 milliGRAM(s) Oral every 6 hours PRN Pruritus  lanolin Ointment 1 Application(s) Topical every 6 hours PRN Sore Nipples  magnesium hydroxide Suspension 30 milliLiter(s) Oral two times a day PRN Constipation  oxyCODONE    IR 5 milliGRAM(s) Oral every 3 hours PRN Moderate to Severe Pain (4-10)  senna 1 Tablet(s) Oral two times a day PRN Constipation  simethicone 80 milliGRAM(s) Chew every 4 hours PRN Gas      LABS:  Blood type: A Positive  Rubella IgG: RPR: Negative                          7.1[L]   17.09[H] >-----------< 258    (  @ 11:35 )             21.4[L]                        6.4[LL]   14.78[H] >-----------< 226    (  @ 05:50 )             19.2[LL]                        7.6[L]   16.63[H] >-----------< 250    (  @ 18:16 )             22.0[L]    24 @ 12:30      139  |  108[H]  |  8   ----------------------------<  99  4.2   |  22  |  0.79        Ca    8.2[L]      2024 12:30    TPro  5.6[L]  /  Alb  2.7[L]  /  TBili  0.2  /  DBili  x   /  AST  23  /  ALT  36[H]  /  AlkPhos  102  24 @ 12:30        Physical exam:  Gen: NAD  Abdomen: Soft, nontender, no distension , firm uterine fundus at umbilicus.  Incision: steri strips clean, dry, and intact   Pelvic: Normal lochia noted  Ext: No calf tenderness, no erythema no edema, pedal pulse 2+    A/P: 38yo now  POD#4 s/p pLTCS in setting of PPROM at 36.6 weeks, breech presentation.  mL.  Patient is stable and is doing well post-operatively.    #symptomatic hypotension  -now resolved  -s/p 1 unit PRBCs  -BPs 107/66 - 108/62  -11.8/34.3 ->9.3/27.6 ->1U PRBC -> 10.0/29.3 -> 8.9/26.7 -> 7.7/22.2 -> 7.6/22.7 -> 7.6/22 -> 6.4/19.2 -> 7.1/21.4  -CT A/P (): Mild slightly hyperattenuating fluid in the peritoneal cavity, compatible with minimal hemoperitoneum. No evidence of active hemorrhage.  -f/u repeat CBC    #postpartum  - Continue routine Postop/ PP care  - Continue motrin, tylenol, oxycodone PRN for pain control.  - Increase ambulation  - abdominal binder prn  - Continue regular diet  - Discharge planning  - f/u in MD office 1-2 wks incision check    Marlys Guthrie NP

## 2024-11-08 ENCOUNTER — APPOINTMENT (OUTPATIENT)
Dept: ANTEPARTUM | Facility: CLINIC | Age: 39
End: 2024-11-08

## 2024-11-11 ENCOUNTER — APPOINTMENT (OUTPATIENT)
Dept: ANTEPARTUM | Facility: CLINIC | Age: 39
End: 2024-11-11

## 2024-11-11 ENCOUNTER — APPOINTMENT (OUTPATIENT)
Dept: OBGYN | Facility: CLINIC | Age: 39
End: 2024-11-11

## 2024-11-18 ENCOUNTER — NON-APPOINTMENT (OUTPATIENT)
Age: 39
End: 2024-11-18

## 2024-11-18 ENCOUNTER — APPOINTMENT (OUTPATIENT)
Dept: OBGYN | Facility: CLINIC | Age: 39
End: 2024-11-18

## 2024-11-18 ENCOUNTER — APPOINTMENT (OUTPATIENT)
Dept: ANTEPARTUM | Facility: CLINIC | Age: 39
End: 2024-11-18